# Patient Record
Sex: MALE | Race: WHITE | NOT HISPANIC OR LATINO
[De-identification: names, ages, dates, MRNs, and addresses within clinical notes are randomized per-mention and may not be internally consistent; named-entity substitution may affect disease eponyms.]

---

## 2018-09-20 ENCOUNTER — TRANSCRIPTION ENCOUNTER (OUTPATIENT)
Age: 48
End: 2018-09-20

## 2018-10-01 ENCOUNTER — TRANSCRIPTION ENCOUNTER (OUTPATIENT)
Age: 48
End: 2018-10-01

## 2019-07-06 ENCOUNTER — OUTPATIENT (OUTPATIENT)
Dept: OUTPATIENT SERVICES | Facility: HOSPITAL | Age: 49
LOS: 1 days | End: 2019-07-06
Payer: COMMERCIAL

## 2019-07-06 PROCEDURE — 76870 US EXAM SCROTUM: CPT | Mod: 26

## 2019-07-17 ENCOUNTER — OUTPATIENT (OUTPATIENT)
Dept: OUTPATIENT SERVICES | Facility: HOSPITAL | Age: 49
LOS: 1 days | End: 2019-07-17

## 2020-07-20 ENCOUNTER — EMERGENCY (EMERGENCY)
Facility: HOSPITAL | Age: 50
LOS: 1 days | End: 2020-07-20
Admitting: EMERGENCY MEDICINE
Payer: OTHER MISCELLANEOUS

## 2020-07-20 PROCEDURE — 12001 RPR S/N/AX/GEN/TRNK 2.5CM/<: CPT

## 2020-07-20 PROCEDURE — 99283 EMERGENCY DEPT VISIT LOW MDM: CPT | Mod: 25

## 2021-03-05 ENCOUNTER — EMERGENCY (EMERGENCY)
Facility: HOSPITAL | Age: 51
LOS: 1 days | End: 2021-03-05
Admitting: EMERGENCY MEDICINE
Payer: OTHER MISCELLANEOUS

## 2021-03-05 PROCEDURE — 73110 X-RAY EXAM OF WRIST: CPT | Mod: 26,LT

## 2021-03-05 PROCEDURE — 99283 EMERGENCY DEPT VISIT LOW MDM: CPT

## 2021-04-14 PROBLEM — Z00.00 ENCOUNTER FOR PREVENTIVE HEALTH EXAMINATION: Status: ACTIVE | Noted: 2021-04-14

## 2021-04-22 ENCOUNTER — APPOINTMENT (OUTPATIENT)
Dept: ORTHOPEDIC SURGERY | Facility: CLINIC | Age: 51
End: 2021-04-22
Payer: OTHER MISCELLANEOUS

## 2021-04-22 VITALS — HEIGHT: 76 IN | TEMPERATURE: 97.7 F | BODY MASS INDEX: 29.22 KG/M2 | WEIGHT: 240 LBS

## 2021-04-22 DIAGNOSIS — Z78.9 OTHER SPECIFIED HEALTH STATUS: ICD-10-CM

## 2021-04-22 PROCEDURE — 99072 ADDL SUPL MATRL&STAF TM PHE: CPT

## 2021-04-22 PROCEDURE — 73110 X-RAY EXAM OF WRIST: CPT | Mod: LT

## 2021-04-22 PROCEDURE — 99203 OFFICE O/P NEW LOW 30 MIN: CPT

## 2021-04-22 NOTE — PHYSICAL EXAM
[de-identified] : -Constitutional: This is a male in not obvious distress.\par -Psych: Patient is alert and oriented to person, place and time. Patient has a normal mood and affect.\par -Cardiovascular: Normal pulses throughout the upper extremities. No significant varicosities are noted in the upper extremities.\par -Neuro: Strength and sensation are intact throughout the upper extremities. Patient has normal coordination.\par -Respiratory: Patient exhibits no evidence of shortness of breath or difficulty breathing.\par -Skin: No rashes, lesions, or other abnormalities are noted in the upper extremities.\par \par ---\par \par Examination of his left wrist demonstrates no obvious swelling.  He is tender along the dorsal wrist capsule in the region of the scapholunate ligament.  There is no localized swelling or tenderness along the distal radius, snuffbox or TFCC ligament.  He has limitation of flexion extension of the wrist with associated pain.  There is a negative Davis sign.  He has full flexion and extension of the digits.  He is neurovascularly intact distally.\par   [de-identified] : PA, lateral and PA  views of his left wrist demonstrate no obvious fractures, dislocations or instability patterns.  There is some dorsiflexion of the lunate on the lateral.  I did obtain comparison views of his right wrist to better assess his carpal alignment.

## 2021-04-22 NOTE — ADDENDUM
[FreeTextEntry1] : This note was written by Meg Bai on 04/22/2021 acting solely as a scribe for Dr. Cipriano Abraham.\par \par All medical record entries made by the scribe were at my, Dr. Cipriano Abraham, direction and personally dictated by me on 04/22/2021. I have personally reviewed the chart and agree that the record accurately reflects my personal performance of the history, physical exam, assessment, and plan

## 2021-04-22 NOTE — HISTORY OF PRESENT ILLNESS
[Right] : right hand dominant [FreeTextEntry1] : Workmen's Compensation Case\par Date of accident: 3/4/2021\par Working: No\par \par He comes in today for evaluation of a left wrist injury which occurred 3/4/2021. He was pushing heavy boxes at work and a heavy box rolled and he held it back with his hand. He went to [facility] on 3/5/2021. He was negative for fractures and was diagnosed with a sprained wrist. He reports improvement since the injury. He has been wearing a splint and taking ibuprofen daily. He has pain on the top of his wrist and he notes decreased  strength. He rates his pain a 1-2 out of 10 at this time. \par \par He was referred by [facility].

## 2021-05-06 ENCOUNTER — TRANSCRIPTION ENCOUNTER (OUTPATIENT)
Age: 51
End: 2021-05-06

## 2021-05-12 ENCOUNTER — NON-APPOINTMENT (OUTPATIENT)
Age: 51
End: 2021-05-12

## 2021-05-20 ENCOUNTER — APPOINTMENT (OUTPATIENT)
Dept: ORTHOPEDIC SURGERY | Facility: CLINIC | Age: 51
End: 2021-05-20
Payer: OTHER MISCELLANEOUS

## 2021-05-20 VITALS — BODY MASS INDEX: 29.22 KG/M2 | WEIGHT: 240 LBS | TEMPERATURE: 97.8 F | HEIGHT: 76 IN

## 2021-05-20 PROCEDURE — 99214 OFFICE O/P EST MOD 30 MIN: CPT

## 2021-05-20 PROCEDURE — 99072 ADDL SUPL MATRL&STAF TM PHE: CPT

## 2021-05-20 NOTE — PHYSICAL EXAM
[de-identified] : -Constitutional: This is a male in not obvious distress.\par -Psych: Patient is alert and oriented to person, place and time. Patient has a normal mood and affect.\par -Cardiovascular: Normal pulses throughout the upper extremities. No significant varicosities are noted in the upper extremities.\par -Neuro: Strength and sensation are intact throughout the upper extremities. Patient has normal coordination.\par -Respiratory: Patient exhibits no evidence of shortness of breath or difficulty breathing.\par -Skin: No rashes, lesions, or other abnormalities are noted in the upper extremities.\par \par ---\par \par Examination of his left wrist demonstrates no obvious swelling.  He is tender along the dorsal wrist capsule in the region of the scapholunate ligament.  There is no localized swelling or tenderness along the distal radius, snuffbox or TFCC ligament.  There is no pain ulnarly with ulnar deviation.  He has limitation of flexion extension of the wrist with associated pain.  He has approximately 35 degrees of wrist flexion and extension with 75 degrees of pronation supination.  There is a negative Davis sign.  He has full flexion and extension of the digits.  He is neurovascularly intact distally.\par   [de-identified] : I reviewed the MRI of his left wrist from 5/7/2021.  This demonstrated full-thickness rupture of the dorsal scapholunate ligament with widening of the scapholunate interval.  Focal full-thickness tear of the TFCC ligament along its radial attachment with small DRUJ effusion.  There is adjacent lunate cartilage loss.  There is also evidence of cyst within the lunate.  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  Mild CMC joint arthritis.\par \par Previous PA, lateral and PA  views of his left wrist demonstrated no obvious fractures, dislocations or instability patterns.  There is some dorsiflexion of the lunate on the lateral.  I did obtain comparison views of his right wrist to better assess his carpal alignment.

## 2021-05-20 NOTE — HISTORY OF PRESENT ILLNESS
[FreeTextEntry1] : Workmen's Compensation Case\par Date of accident: 3/4/2021\par Working: No\par \par Follow-up regarding left wrist injury which occurred 3/4/2021. He was pushing heavy boxes at work and a heavy box rolled and he held it follow-up regarding left wrist injury at work on 3/4/2021.  \par \par See note from when he was seen in the office 4 weeks ago.  I ordered an MRI.  He comes in to review the results and discuss treatment recommendations.\par \par He is doing well

## 2021-07-07 ENCOUNTER — OUTPATIENT (OUTPATIENT)
Dept: OUTPATIENT SERVICES | Facility: HOSPITAL | Age: 51
LOS: 1 days | End: 2021-07-07
Payer: COMMERCIAL

## 2021-07-07 VITALS
DIASTOLIC BLOOD PRESSURE: 88 MMHG | SYSTOLIC BLOOD PRESSURE: 153 MMHG | OXYGEN SATURATION: 98 % | HEART RATE: 65 BPM | RESPIRATION RATE: 15 BRPM | TEMPERATURE: 98 F | WEIGHT: 242.07 LBS | HEIGHT: 76 IN

## 2021-07-07 DIAGNOSIS — K08.409 PARTIAL LOSS OF TEETH, UNSPECIFIED CAUSE, UNSPECIFIED CLASS: Chronic | ICD-10-CM

## 2021-07-07 DIAGNOSIS — M25.532 PAIN IN LEFT WRIST: ICD-10-CM

## 2021-07-07 DIAGNOSIS — R94.31 ABNORMAL ELECTROCARDIOGRAM [ECG] [EKG]: ICD-10-CM

## 2021-07-07 DIAGNOSIS — Z90.89 ACQUIRED ABSENCE OF OTHER ORGANS: Chronic | ICD-10-CM

## 2021-07-07 DIAGNOSIS — S63.8X2A SPRAIN OF OTHER PART OF LEFT WRIST AND HAND, INITIAL ENCOUNTER: ICD-10-CM

## 2021-07-07 DIAGNOSIS — Z01.818 ENCOUNTER FOR OTHER PREPROCEDURAL EXAMINATION: ICD-10-CM

## 2021-07-07 LAB
ALBUMIN SERPL ELPH-MCNC: 4.3 G/DL — SIGNIFICANT CHANGE UP (ref 3.3–5)
ALP SERPL-CCNC: 63 U/L — SIGNIFICANT CHANGE UP (ref 30–120)
ALT FLD-CCNC: 46 U/L DA — SIGNIFICANT CHANGE UP (ref 10–60)
ANION GAP SERPL CALC-SCNC: 8 MMOL/L — SIGNIFICANT CHANGE UP (ref 5–17)
AST SERPL-CCNC: 21 U/L — SIGNIFICANT CHANGE UP (ref 10–40)
BILIRUB SERPL-MCNC: 0.5 MG/DL — SIGNIFICANT CHANGE UP (ref 0.2–1.2)
BUN SERPL-MCNC: 15 MG/DL — SIGNIFICANT CHANGE UP (ref 7–23)
CALCIUM SERPL-MCNC: 9.3 MG/DL — SIGNIFICANT CHANGE UP (ref 8.4–10.5)
CHLORIDE SERPL-SCNC: 102 MMOL/L — SIGNIFICANT CHANGE UP (ref 96–108)
CO2 SERPL-SCNC: 28 MMOL/L — SIGNIFICANT CHANGE UP (ref 22–31)
CREAT SERPL-MCNC: 0.97 MG/DL — SIGNIFICANT CHANGE UP (ref 0.5–1.3)
GLUCOSE SERPL-MCNC: 105 MG/DL — HIGH (ref 70–99)
HCT VFR BLD CALC: 45.6 % — SIGNIFICANT CHANGE UP (ref 39–50)
HGB BLD-MCNC: 16.5 G/DL — SIGNIFICANT CHANGE UP (ref 13–17)
MCHC RBC-ENTMCNC: 29.7 PG — SIGNIFICANT CHANGE UP (ref 27–34)
MCHC RBC-ENTMCNC: 36.2 GM/DL — HIGH (ref 32–36)
MCV RBC AUTO: 82.2 FL — SIGNIFICANT CHANGE UP (ref 80–100)
NRBC # BLD: 0 /100 WBCS — SIGNIFICANT CHANGE UP (ref 0–0)
PLATELET # BLD AUTO: 221 K/UL — SIGNIFICANT CHANGE UP (ref 150–400)
POTASSIUM SERPL-MCNC: 3.8 MMOL/L — SIGNIFICANT CHANGE UP (ref 3.5–5.3)
POTASSIUM SERPL-SCNC: 3.8 MMOL/L — SIGNIFICANT CHANGE UP (ref 3.5–5.3)
PROT SERPL-MCNC: 7.9 G/DL — SIGNIFICANT CHANGE UP (ref 6–8.3)
RBC # BLD: 5.55 M/UL — SIGNIFICANT CHANGE UP (ref 4.2–5.8)
RBC # FLD: 11.9 % — SIGNIFICANT CHANGE UP (ref 10.3–14.5)
SODIUM SERPL-SCNC: 138 MMOL/L — SIGNIFICANT CHANGE UP (ref 135–145)
WBC # BLD: 6.86 K/UL — SIGNIFICANT CHANGE UP (ref 3.8–10.5)
WBC # FLD AUTO: 6.86 K/UL — SIGNIFICANT CHANGE UP (ref 3.8–10.5)

## 2021-07-07 PROCEDURE — 36415 COLL VENOUS BLD VENIPUNCTURE: CPT

## 2021-07-07 PROCEDURE — 80053 COMPREHEN METABOLIC PANEL: CPT

## 2021-07-07 PROCEDURE — 93010 ELECTROCARDIOGRAM REPORT: CPT

## 2021-07-07 PROCEDURE — 93005 ELECTROCARDIOGRAM TRACING: CPT

## 2021-07-07 PROCEDURE — G0463: CPT

## 2021-07-07 PROCEDURE — 85027 COMPLETE CBC AUTOMATED: CPT

## 2021-07-07 NOTE — H&P PST ADULT - MUSCULOSKELETAL
details… detailed exam left wrist/arm/hand/no calf tenderness/decreased ROM due to pain/diminished strength

## 2021-07-07 NOTE — H&P PST ADULT - NSICDXPASTMEDICALHX_GEN_ALL_CORE_FT
PAST MEDICAL HISTORY:  GERD (gastroesophageal reflux disease)     Hypothyroid      PAST MEDICAL HISTORY:  GERD (gastroesophageal reflux disease)     Hypothyroid     Rheumatoid arthritis diagnosed a few yrs ago when patient had clicking of fingers-he is asymptomatic and receives no treatment

## 2021-07-07 NOTE — H&P PST ADULT - NSICDXPROBLEM_GEN_ALL_CORE_FT
PROBLEM DIAGNOSES  Problem: Left wrist pain  Assessment and Plan: left wrist diagnostic arthroscopy and possible debridement; patient will have covid swab in [place]; he will call to schedule it; preop instructions given; to go for medical clearance    Problem: Abnormal EKG  Assessment and Plan: to see PCP for evaluation

## 2021-07-07 NOTE — H&P PST ADULT - NSICDXFAMILYHX_GEN_ALL_CORE_FT
FAMILY HISTORY:  FH: alcoholism, father- HTN    Sibling  Still living? Yes, Estimated age: 51-60  FH: ovarian cancer, Age at diagnosis: Age Unknown

## 2021-07-07 NOTE — H&P PST ADULT - HISTORY OF PRESENT ILLNESS
this is a 51 y/o male who had an injury on 3/4/21; tests show tendon problem, to have surgery to evaluate and possibly repair problem

## 2021-07-08 PROBLEM — K21.9 GASTRO-ESOPHAGEAL REFLUX DISEASE WITHOUT ESOPHAGITIS: Chronic | Status: ACTIVE | Noted: 2021-07-07

## 2021-07-08 PROBLEM — M06.9 RHEUMATOID ARTHRITIS, UNSPECIFIED: Chronic | Status: ACTIVE | Noted: 2021-07-07

## 2021-07-08 RX ORDER — CHLORHEXIDINE GLUCONATE 213 G/1000ML
1 SOLUTION TOPICAL ONCE
Refills: 0 | Status: DISCONTINUED | OUTPATIENT
Start: 2021-07-13 | End: 2021-07-27

## 2021-07-08 RX ORDER — CEFAZOLIN SODIUM 1 G
2000 VIAL (EA) INJECTION ONCE
Refills: 0 | Status: DISCONTINUED | OUTPATIENT
Start: 2021-07-13 | End: 2021-07-27

## 2021-07-10 ENCOUNTER — APPOINTMENT (OUTPATIENT)
Dept: DISASTER EMERGENCY | Facility: CLINIC | Age: 51
End: 2021-07-10

## 2021-07-11 LAB — SARS-COV-2 N GENE NPH QL NAA+PROBE: NOT DETECTED

## 2021-07-12 NOTE — ASU PATIENT PROFILE, ADULT - PMH
GERD (gastroesophageal reflux disease)    Hypothyroid    Rheumatoid arthritis  diagnosed a few yrs ago when patient had clicking of fingers-he is asymptomatic and receives no treatment

## 2021-07-13 ENCOUNTER — APPOINTMENT (OUTPATIENT)
Dept: ORTHOPEDIC SURGERY | Facility: HOSPITAL | Age: 51
End: 2021-07-13

## 2021-07-13 ENCOUNTER — NON-APPOINTMENT (OUTPATIENT)
Age: 51
End: 2021-07-13

## 2021-07-13 ENCOUNTER — OUTPATIENT (OUTPATIENT)
Dept: OUTPATIENT SERVICES | Facility: HOSPITAL | Age: 51
LOS: 1 days | End: 2021-07-13
Payer: COMMERCIAL

## 2021-07-13 VITALS
RESPIRATION RATE: 22 BRPM | SYSTOLIC BLOOD PRESSURE: 121 MMHG | DIASTOLIC BLOOD PRESSURE: 83 MMHG | OXYGEN SATURATION: 95 % | HEART RATE: 74 BPM

## 2021-07-13 VITALS
DIASTOLIC BLOOD PRESSURE: 89 MMHG | HEART RATE: 78 BPM | OXYGEN SATURATION: 98 % | HEIGHT: 76 IN | RESPIRATION RATE: 17 BRPM | TEMPERATURE: 98 F | SYSTOLIC BLOOD PRESSURE: 154 MMHG | WEIGHT: 240.74 LBS

## 2021-07-13 DIAGNOSIS — S63.8X2A SPRAIN OF OTHER PART OF LEFT WRIST AND HAND, INITIAL ENCOUNTER: ICD-10-CM

## 2021-07-13 DIAGNOSIS — Z01.818 ENCOUNTER FOR OTHER PREPROCEDURAL EXAMINATION: ICD-10-CM

## 2021-07-13 DIAGNOSIS — K08.409 PARTIAL LOSS OF TEETH, UNSPECIFIED CAUSE, UNSPECIFIED CLASS: Chronic | ICD-10-CM

## 2021-07-13 DIAGNOSIS — Z90.89 ACQUIRED ABSENCE OF OTHER ORGANS: Chronic | ICD-10-CM

## 2021-07-13 PROCEDURE — 29846 WRIST ARTHROSCOPY/SURGERY: CPT | Mod: LT

## 2021-07-13 RX ORDER — ONDANSETRON 8 MG/1
4 TABLET, FILM COATED ORAL ONCE
Refills: 0 | Status: DISCONTINUED | OUTPATIENT
Start: 2021-07-13 | End: 2021-07-14

## 2021-07-13 RX ORDER — SODIUM CHLORIDE 9 MG/ML
1000 INJECTION, SOLUTION INTRAVENOUS
Refills: 0 | Status: DISCONTINUED | OUTPATIENT
Start: 2021-07-13 | End: 2021-07-14

## 2021-07-13 RX ORDER — HYDROMORPHONE HYDROCHLORIDE 2 MG/ML
0.5 INJECTION INTRAMUSCULAR; INTRAVENOUS; SUBCUTANEOUS
Refills: 0 | Status: DISCONTINUED | OUTPATIENT
Start: 2021-07-13 | End: 2021-07-14

## 2021-07-13 RX ORDER — CELECOXIB 200 MG/1
1 CAPSULE ORAL
Qty: 15 | Refills: 0
Start: 2021-07-13

## 2021-07-13 RX ORDER — OXYCODONE HYDROCHLORIDE 5 MG/1
5 TABLET ORAL ONCE
Refills: 0 | Status: DISCONTINUED | OUTPATIENT
Start: 2021-07-13 | End: 2021-07-14

## 2021-07-13 RX ADMIN — SODIUM CHLORIDE 75 MILLILITER(S): 9 INJECTION, SOLUTION INTRAVENOUS at 14:19

## 2021-07-13 NOTE — BRIEF OPERATIVE NOTE - NSICDXBRIEFPREOP_GEN_ALL_CORE_FT
PRE-OP DIAGNOSIS:  Traumatic rupture of scapholunate ligament 13-Jul-2021 13:22:55 LEFT Bridgett Darden

## 2021-07-13 NOTE — ASU DISCHARGE PLAN (ADULT/PEDIATRIC) - CARE PROVIDER_API CALL
Cipriano Abraham)  Orthopaedic Surgery; Surgery of the Hand  [address], [address]  [place] [address]  Phone: [phone]  Fax: [phone]  Scheduled Appointment: 07/22/2021

## 2021-07-13 NOTE — ASU DISCHARGE PLAN (ADULT/PEDIATRIC) - ASU DC SPECIAL INSTRUCTIONSFT
Keep splint clean and dry. Do not remove splint prior to follow up appointment..    Wiggle fingers in splint throughout the day.     Apply ice packs for 20 minutes at a time to left wrist to help reduce pain and swelling.    Follow up with Dr. Abraham in the [place] office on Thursday, July 22nd.

## 2021-07-13 NOTE — BRIEF OPERATIVE NOTE - NSICDXBRIEFPOSTOP_GEN_ALL_CORE_FT
POST-OP DIAGNOSIS:  Traumatic rupture of scapholunate ligament 13-Jul-2021 13:23:02 LEFT Bridgett Darden

## 2021-07-13 NOTE — ASU DISCHARGE PLAN (ADULT/PEDIATRIC) - BATHING
Keep splint clean and dry. Cover with plastic or cast cover to prevent from getting wet when showering or bathing./Do not submerge in water

## 2021-07-22 ENCOUNTER — APPOINTMENT (OUTPATIENT)
Dept: ORTHOPEDIC SURGERY | Facility: CLINIC | Age: 51
End: 2021-07-22
Payer: OTHER MISCELLANEOUS

## 2021-07-22 VITALS — TEMPERATURE: 97.4 F | WEIGHT: 240 LBS | HEIGHT: 76 IN | BODY MASS INDEX: 29.22 KG/M2

## 2021-07-22 PROCEDURE — 99024 POSTOP FOLLOW-UP VISIT: CPT

## 2021-07-22 PROCEDURE — 73110 X-RAY EXAM OF WRIST: CPT | Mod: LT

## 2021-07-22 NOTE — HISTORY OF PRESENT ILLNESS
[de-identified] : 9 days post-operative. [de-identified] : 9 days status post left wrist diagnostic arthroscopy and debridement.\par \par See operative note, diagnostic arthroscopy demonstrated a full-thickness scapholunate ligament rupture with gross instability.\par \par He is having some pain, comparable to preoperative. [de-identified] : Examination of his left wrist after the splint and dressing was removed demonstrates his portal incisions to be clean and dry.  There is mild swelling and ecchymosis.  He has full flexion and extension of the digits.  He has approximately 25 degrees of wrist flexion and 20 degrees extension, similar to preoperative.  He is neurovascularly intact distally. [de-identified] : PA, lateral, PA ulnar deviation and PA  views of his left wrist demonstrate a DISI deformity and an increased scapholunate ligament angle. There is no obvious widening of the scapholunate interval. [de-identified] : Stable, 9 days post-operative, status post diagnostic left wrist arthroscopy, with evidence of a full-thickness scapholunate ligament rupture. [de-identified] : The sutures were removed and steri-strips were applied.  He was instructed on activity modification, protection with a splint, gentle range of motion exercises and local wound care.\par \par We discussed definitive treatment of his scapholunate ligament rupture.  Based upon the findings on wrist arthroscopy, there is no evidence of significant arthritis.  I therefore recommended scapholunate ligament reconstruction.  I would also recommend PIN and AIN neurectomy at the same time.  I had a lengthy discussion with him regarding the procedure and expected recovery.  He has done some research on his own.  I did tell him that, unfortunately, there is no definitive treatment that produces excellent results for this injury.  However, if left alone, patient develop arthritis.  He would like to go ahead and schedule surgery.  I have recommended reconstruction of the scapholunate ligament with a palmaris longus autograft and at the same time PIN and AIN neurectomy.  He will be scheduled once authorized by Workmen's Compensation.  He understands the prolonged recovery and the need for casting for 6 to 8 weeks.  I also discussed that he may need to return to the OR if the wire cannot be removed in the office after 6 to 8 weeks.\par \par -  The nature and purposes of the operation/procedure was discussed in detail.  I discussed the surgical procedure in detail, as well as expected postoperative recovery and outcome.\par -  Possible risks, benefits, and complications (from known and unknown causes) of the procedure were discussed in detail.  \par -  Possible non-operative alternatives to the proposed treatment were discussed in detail.  \par -  He was told that possible risks/complications include, but are not limited to:  Infection, dorsal sensory nerve or vessel injury, stiffness, painful scar, poor outcome, need for additional surgical procedures, and other unforeseen complications.  \par -  In addition, the possibility of an "unsuccessful outcome," despite "successful surgery," was discussed with him.  Again, he understands the unpredictable nature of this injury and reconstructive procedure.  He understands that he may require further surgery in the future and if the surgery fails, the possible need for either a proximal row carpectomy or a scaphoid excision and 4 corner fusion was discussed in the future.  He also understands the risks of the development of arthritis in the future and potential failure of the ligament reconstruction.\par -  The patient fully understands these risks and wishes to proceed.  \par -  I had a lengthy discussion with the patient regarding today's visit, the diagnosis, and my surgical treatment recommendations.  The patient has agreed to this plan of management and has expressed full understanding.  All questions were fully answered to the patient's satisfaction.

## 2021-08-10 ENCOUNTER — OUTPATIENT (OUTPATIENT)
Dept: OUTPATIENT SERVICES | Facility: HOSPITAL | Age: 51
LOS: 1 days | End: 2021-08-10
Payer: COMMERCIAL

## 2021-08-10 VITALS
SYSTOLIC BLOOD PRESSURE: 130 MMHG | WEIGHT: 246.04 LBS | OXYGEN SATURATION: 99 % | TEMPERATURE: 97 F | DIASTOLIC BLOOD PRESSURE: 80 MMHG | RESPIRATION RATE: 14 BRPM | HEART RATE: 63 BPM | HEIGHT: 76 IN

## 2021-08-10 DIAGNOSIS — Z98.890 OTHER SPECIFIED POSTPROCEDURAL STATES: Chronic | ICD-10-CM

## 2021-08-10 DIAGNOSIS — K08.409 PARTIAL LOSS OF TEETH, UNSPECIFIED CAUSE, UNSPECIFIED CLASS: Chronic | ICD-10-CM

## 2021-08-10 DIAGNOSIS — Z01.818 ENCOUNTER FOR OTHER PREPROCEDURAL EXAMINATION: ICD-10-CM

## 2021-08-10 DIAGNOSIS — S63.8X2A SPRAIN OF OTHER PART OF LEFT WRIST AND HAND, INITIAL ENCOUNTER: ICD-10-CM

## 2021-08-10 DIAGNOSIS — Z90.89 ACQUIRED ABSENCE OF OTHER ORGANS: Chronic | ICD-10-CM

## 2021-08-10 LAB
ANION GAP SERPL CALC-SCNC: 7 MMOL/L — SIGNIFICANT CHANGE UP (ref 5–17)
BUN SERPL-MCNC: 19 MG/DL — SIGNIFICANT CHANGE UP (ref 7–23)
CALCIUM SERPL-MCNC: 9.3 MG/DL — SIGNIFICANT CHANGE UP (ref 8.4–10.5)
CHLORIDE SERPL-SCNC: 102 MMOL/L — SIGNIFICANT CHANGE UP (ref 96–108)
CO2 SERPL-SCNC: 26 MMOL/L — SIGNIFICANT CHANGE UP (ref 22–31)
CREAT SERPL-MCNC: 0.95 MG/DL — SIGNIFICANT CHANGE UP (ref 0.5–1.3)
GLUCOSE SERPL-MCNC: 98 MG/DL — SIGNIFICANT CHANGE UP (ref 70–99)
HCT VFR BLD CALC: 45.6 % — SIGNIFICANT CHANGE UP (ref 39–50)
HGB BLD-MCNC: 16.4 G/DL — SIGNIFICANT CHANGE UP (ref 13–17)
MCHC RBC-ENTMCNC: 29.9 PG — SIGNIFICANT CHANGE UP (ref 27–34)
MCHC RBC-ENTMCNC: 36 GM/DL — SIGNIFICANT CHANGE UP (ref 32–36)
MCV RBC AUTO: 83.2 FL — SIGNIFICANT CHANGE UP (ref 80–100)
NRBC # BLD: 0 /100 WBCS — SIGNIFICANT CHANGE UP (ref 0–0)
PLATELET # BLD AUTO: 249 K/UL — SIGNIFICANT CHANGE UP (ref 150–400)
POTASSIUM SERPL-MCNC: 4 MMOL/L — SIGNIFICANT CHANGE UP (ref 3.5–5.3)
POTASSIUM SERPL-SCNC: 4 MMOL/L — SIGNIFICANT CHANGE UP (ref 3.5–5.3)
RBC # BLD: 5.48 M/UL — SIGNIFICANT CHANGE UP (ref 4.2–5.8)
RBC # FLD: 12.2 % — SIGNIFICANT CHANGE UP (ref 10.3–14.5)
SODIUM SERPL-SCNC: 135 MMOL/L — SIGNIFICANT CHANGE UP (ref 135–145)
WBC # BLD: 8.08 K/UL — SIGNIFICANT CHANGE UP (ref 3.8–10.5)
WBC # FLD AUTO: 8.08 K/UL — SIGNIFICANT CHANGE UP (ref 3.8–10.5)

## 2021-08-10 PROCEDURE — 85027 COMPLETE CBC AUTOMATED: CPT

## 2021-08-10 PROCEDURE — G0463: CPT

## 2021-08-10 PROCEDURE — 93005 ELECTROCARDIOGRAM TRACING: CPT

## 2021-08-10 PROCEDURE — 36415 COLL VENOUS BLD VENIPUNCTURE: CPT

## 2021-08-10 PROCEDURE — 93010 ELECTROCARDIOGRAM REPORT: CPT

## 2021-08-10 PROCEDURE — 80048 BASIC METABOLIC PNL TOTAL CA: CPT

## 2021-08-10 NOTE — H&P PST ADULT - HISTORY OF PRESENT ILLNESS
This is a 49 y/o male who had an injury on 3/4/21; tests show tendon problem, to have surgery to evaluate and possibly repair problem This is a 51 y/o male presents with complaint of left wrist pain , limited ROM , diminished strength s/p work injury 3/4/32 .He had undergone left wrist diagnostic arthroscopy and debridement on 7/13/21 .with no improvement  scheduled for left wrist scapholunate ligament reconstruction on 8/24/21  This is a 49 y/o male presents with complaint of left wrist pain , limited ROM , diminished strength s/p work injury 3/4/21 .He had undergone left wrist diagnostic arthroscopy and debridement on 7/13/21 .with no improvement  scheduled for left wrist scapholunate ligament reconstruction on 8/24/21

## 2021-08-10 NOTE — H&P PST ADULT - NSICDXPASTMEDICALHX_GEN_ALL_CORE_FT
PAST MEDICAL HISTORY:  GERD (gastroesophageal reflux disease)     Hypothyroid no medications    Rheumatoid arthritis diagnosed a few yrs ago when patient had clicking of fingers-he is asymptomatic and receives no treatment

## 2021-08-10 NOTE — H&P PST ADULT - ASSESSMENT
this is a 51 y/o male who is scheduled for left wrist diagnostic arthroscopy on 7/13/21 49 y/o male with left wrist pain      scheduled for left wrist scapholunate ligament reconstruction on 8/24/21   Medical clearance   Pre op instructions   COVID test at [facility]

## 2021-08-10 NOTE — H&P PST ADULT - MUSCULOSKELETAL
left wrist/arm/hand/no calf tenderness/decreased ROM due to pain/diminished strength details… detailed exam

## 2021-08-10 NOTE — H&P PST ADULT - NSICDXPASTSURGICALHX_GEN_ALL_CORE_FT
PAST SURGICAL HISTORY:  S/P tonsillectomy     S/P tooth extraction wisdom tooth    Status post arthroscopy left wrist diagnostic arthroscopy 7/13/21

## 2021-08-12 NOTE — HISTORY OF PRESENT ILLNESS
[de-identified] : 5 weeks and 2 days post-operative. [de-identified] : 5 weeks and 2 days status post left wrist diagnostic arthroscopy and debridement.\par \par See note from when he was seen in the office 4 weeks ago.  I requested authorization for scapholunate ligament reconstruction and AIN and PIN neurectomy from Worker's Compensation.\par \par He is [de-identified] : Examination of his left wrist demonstrates his portal incisions to be well-healed.  There is decreased swelling.  He has full flexion and extension of the digits.  He has approximately 25 degrees of wrist flexion and 20 degrees extension, similar to preoperative.  He is neurovascularly intact distally. [de-identified] : Previous PA, lateral, PA ulnar deviation and PA  views of his left wrist demonstrate a DISI deformity and an increased scapholunate ligament angle. There is no obvious widening of the scapholunate interval. [de-identified] : Stable, 5 weeks and 2 days post-operative, status post diagnostic left wrist arthroscopy, with evidence of a full-thickness scapholunate ligament rupture. [de-identified] : The sutures were removed and steri-strips were applied.  He was instructed on activity modification, protection with a splint, gentle range of motion exercises and local wound care.\par \par We discussed definitive treatment of his scapholunate ligament rupture.  Based upon the findings on wrist arthroscopy, there is no evidence of significant arthritis.  I therefore recommended scapholunate ligament reconstruction.  I would also recommend PIN and AIN neurectomy at the same time.  I had a lengthy discussion with him regarding the procedure and expected recovery.  He has done some research on his own.  I did tell him that, unfortunately, there is no definitive treatment that produces excellent results for this injury.  However, if left alone, patient develop arthritis.  He would like to go ahead and schedule surgery.  I have recommended reconstruction of the scapholunate ligament with a palmaris longus autograft and at the same time PIN and AIN neurectomy.  He will be scheduled once authorized by Workmen's Compensation.  He understands the prolonged recovery and the need for casting for 6 to 8 weeks.  I also discussed that he may need to return to the OR if the wire cannot be removed in the office after 6 to 8 weeks.\par \par -  The nature and purposes of the operation/procedure was discussed in detail.  I discussed the surgical procedure in detail, as well as expected postoperative recovery and outcome.\par -  Possible risks, benefits, and complications (from known and unknown causes) of the procedure were discussed in detail.  \par -  Possible non-operative alternatives to the proposed treatment were discussed in detail.  \par -  He was told that possible risks/complications include, but are not limited to:  Infection, dorsal sensory nerve or vessel injury, stiffness, painful scar, poor outcome, need for additional surgical procedures, and other unforeseen complications.  \par -  In addition, the possibility of an "unsuccessful outcome," despite "successful surgery," was discussed with him.  Again, he understands the unpredictable nature of this injury and reconstructive procedure.  He understands that he may require further surgery in the future and if the surgery fails, the possible need for either a proximal row carpectomy or a scaphoid excision and 4 corner fusion was discussed in the future.  He also understands the risks of the development of arthritis in the future and potential failure of the ligament reconstruction.\par -  The patient fully understands these risks and wishes to proceed.  \par -  I had a lengthy discussion with the patient regarding today's visit, the diagnosis, and my surgical treatment recommendations.  The patient has agreed to this plan of management and has expressed full understanding.  All questions were fully answered to the patient's satisfaction.

## 2021-08-15 PROBLEM — E03.9 HYPOTHYROIDISM, UNSPECIFIED: Chronic | Status: ACTIVE | Noted: 2021-07-07

## 2021-08-17 DIAGNOSIS — Z01.818 ENCOUNTER FOR OTHER PREPROCEDURAL EXAMINATION: ICD-10-CM

## 2021-08-19 ENCOUNTER — APPOINTMENT (OUTPATIENT)
Dept: ORTHOPEDIC SURGERY | Facility: CLINIC | Age: 51
End: 2021-08-19

## 2021-08-21 ENCOUNTER — APPOINTMENT (OUTPATIENT)
Dept: DISASTER EMERGENCY | Facility: CLINIC | Age: 51
End: 2021-08-21

## 2021-08-22 LAB — SARS-COV-2 N GENE NPH QL NAA+PROBE: NOT DETECTED

## 2021-08-23 ENCOUNTER — TRANSCRIPTION ENCOUNTER (OUTPATIENT)
Age: 51
End: 2021-08-23

## 2021-08-23 NOTE — ASU PATIENT PROFILE, ADULT - PRO MENTAL HEALTH SX RECENT
[facility]    [address] [address]    Phone:  [phone]       Thank You for choosing us for your health care visit. We are glad to serve you and happy to provide you with this summary of your visit. Please help us to ensure we have accurate records. If you find anything that needs to be changed, please let our staff know as soon as possible.          Your Demographic Information     Patient Name Sex Zeb Newby Male 2014       Ethnic Group Patient Race    Not of  or  Origin White      Your Visit Details     Date & Time Provider Department    2017 6:15 PM Sarah Schwartz PA-C [facility]      Your Upcoming Appointment*(Max 10)     2017 10:00 AM CST   Well Child Exam with Alfredo Burns DO   [facility] - [facility] ([facility])    [address] [address] [phone]              Your To Do List     Follow-Up    Return if symptoms worsen or fail to improve.      Conditions Discussed Today or Order-Related Diagnoses        Comments    Acute suppurative otitis media of both ears without spontaneous rupture of tympanic membranes, recurrence not specified    -  Primary       Your Vitals Were     Pulse Temp Resp Height Weight SpO2    126 97.6 °F (36.4 °C) (Temporal Artery) 26 2' 0.75\" (0.629 m) (<1 %, Z= -6.83)* 31 lb 8.4 oz (14.3 kg) (86 %, Z= 1.06)* 97%    BMI                36.18 kg/m2 (>99 %, Z= 4.95)*        *Growth percentiles are based on CDC 2-20 Years data.      Medications Prescribed or Re-Ordered Today     amoxicillin (AMOXIL) 400 MG/5ML suspension    Sig - Route: Take 8 mLs by mouth 2 times daily. - Oral    Class: Eprescribe    [facility] [id number] [place] [address] [place] - [address] AT [facility] #: [phone]      Your Current Medications Are        Disp Refills  Start End    amoxicillin (AMOXIL) 400 MG/5ML suspension 160 mL 0 1/13/2017     Sig - Route: Take 8 mLs by mouth 2 times daily. - Oral    Class: Eprescribe      Allergies     No Known Allergies              Patient Instructions      Acute Otitis Media with Infection (Child)    Your child has a middle ear infection (acute otitis media). It is caused by bacteria or fungi. The middle ear is the space behind the eardrum. The eustachian tube connects the ear to the nasal passage. The eustachian tubes help drain fluid from the ears. They also keep the air pressure equal inside and outside the ears. These tubes are shorter and more horizontal in children. This makes it more likely for the tubes to become blocked. A blockage lets fluid and pressure build up in the middle ear. Bacteria or fungi can grow in this fluid and cause an ear infection. This infection is commonly known as an earache.  The main symptom of an ear infection is ear pain. Other symptoms may include pulling at the ear, being more fussy than usual, decreased appetie, vomiting or diarrhea.Your child’s hearing may also be affected. Your child may have had a respiratory infection first.  An ear infection may clear up on its own. Or your child may need to take medicine. After the infection goes away, your child may still have fluid in the middle ear. It may take weeks or months for this fluid to go away. During that time, your child may have temporary hearing loss. But all other symptoms of the earache should be gone.  Home care  Follow these guidelines when caring for your child at home:  · The health care provider will likely prescribe medicines for pain. The provider may also prescribe antibiotics or antifungals to treat the infection. These may be liquid medicines to give by mouth. Or they may be ear drops. Follow the provider’s instructions for giving these medicines to your child.  · Because ear infections can clear up on their own, the provider may  suggest waiting for a few days before giving your child medicines for infection.  · To reduce pain, have your child rest in an upright position. Hot or cold compresses held against the ear may help ease pain.  · Keep the ear dry. Have your child wear a shower cap when bathing.  To help prevent future infections:  · Avoid smoking near your child. Secondhand smoke raises the risk for ear infections in children.  · Make sure your child gets all appropriate vaccinations.  · Do not bottle feed while your baby is lying on his or her back. (This position can cause  middle ear infections because it allows milk to run into the eustacian tubes.)      · If you breastfeed ccontinue until your child is 6-12 months of age.  To apply ear drops:  1. Put the bottle in warm water if the medicine is kept in the refrigerator. Cold drops in the ear are uncomfortable.  2. Have your child lie down on a flat surface. Gently hold your child’s head to one side.  3. Remove any drainage from the ear with a clean tissue or cotton swab. Clean only the outer ear. Don’t put the cotton swab into the ear canal.  4. Straighten the ear canal by gently pulling the earlobe up and back.  5. Keep the dropper a half-inch above the ear canal. This will keep the dropper from becoming contaminated. Put the drops against the side of the ear canal.  6. Have your child stay lying down for 2 to 3 minutes. This gives time for the medicine to enter the ear canal. If your child doesn’t have pain, gently massage the outer ear near the opening.  7. Wipe any extra medicine away from the outer ear with a clean cotton ball.  Follow-up care  Follow up with your child’s healthcare provider as directed. Your child will need to have the ear rechecked to make sure the infection has resolved. Check with your doctor to see when they want to see your child.  Special note to parents  If your child continues to get earaches, he or she may need ear tubes. The provider will put  small tubes in your child’s eardrum to help keep fluid from building up. This procedure is a simple and works well.  When to seek medical advice  Unless advised otherwise, call your child's healthcare provider if:  · Your child is 3 months old or younger and has a fever of 100.4°F (38°C) or higher. Your child may need to see a healthcare provider.  · Your child is of any age and has fevers higher than 104°F (40°C) that come back again and again.  Call your child's healthcare provider for any of the following:  · New symptoms, especially swelling around the ear or weakness of face muscles  · Severe pain  · Infection seems to get worse, not better   · Neck pain  · Your child acts very sick or not themself  · Fever or pain do not improve with antibiotics after 48 hours  © 2697-4352 [employer]. [address] [place] [address], [place] [address]. All rights reserved. This information is not intended as a substitute for professional medical care. Always follow your healthcare professional's instructions.    Thank you for visiting the [facility] in [place].     It is difficult to recognize all elements of any illness or injury in a single visit. The examination, treatment, and x-rays received are on a preliminary basis only. A radiologist will also review your x-rays for final reading.     Call your primary care provider if you have questions or problems before your next appointment. If you are unable to reach your Primary Care Provider (PCP), please call or return to the Walk-In Clinic. If symptoms worsen or do not resolve please follow-up with your Primary Care Provider (PCP), Walk-In or the nearest Emergency Room for emergency symptoms. If you are unsure of whom to follow up with, call [phone] and ask to speak with either your PCP, the Urgent Care nurse or the on-call Provider if you are calling after hours.     If you are referred to a specialist or scheduled for a test, our  Referrals department will call you with your appointment date and time within 3 business days. If you have not heard from them in this time frame, please call [phone] and ask for the Referrals department.     Test results: Unless otherwise instructed, you should be notified of test results within one week. Please call our office if you do not hear from us within this time frame at [phone].     Hours:   Monday through Friday: 7:00 am to 7:00 pm   Saturday: 8:00 am to 2:00 pm   Holiday hours may vary, please call [phone] on Holidays.   Walk-In is closed on Thanksgiving Day, Jun[place] Day and New Year's Day.     Thank you again for visiting [facility], Urgent Care Clinic, [place], [place].     Sarah Schwartz PA-C    Help us to grow our quality of service! We want to improve - and you can help!You may receive a survey in the mail. This is your opportunity to tell us what excellent service you received, and where we could use improvement. We value your input!     SCHEDULE ONLINE:  Interested in decreasing your wait time in the Walk-in/Urgent Care Clinic?  Same day appointments can now be made.  Go to [email or web address] and scroll down to the green area.  Click on Urgent Care Reservations and make an appointment at the location of your choice.  There you will find the current wait times at each [place] site.  We will do our best to honor your scheduled appointment time but please understand that wait times are subject to change once you arrive at the clinic.          none

## 2021-08-23 NOTE — ASU PATIENT PROFILE, ADULT - BILL OF RIGHTS/ADMISSION INFORMATION PROVIDED TO:
Patient A&O X1-2, hx dementia, on RA. VSS, . Incontinent with brief- DTV by 0915, LBM 5/31. Patient admitted for unwitnessed fall and UTI. Prior left hip ORIF, covered with ABD dressing, c/d/i. Staples intact to left hip.  Immobilizer in place to LLE fro Patient

## 2021-08-24 ENCOUNTER — RESULT REVIEW (OUTPATIENT)
Age: 51
End: 2021-08-24

## 2021-08-24 ENCOUNTER — APPOINTMENT (OUTPATIENT)
Dept: ORTHOPEDIC SURGERY | Facility: HOSPITAL | Age: 51
End: 2021-08-24

## 2021-08-24 ENCOUNTER — OUTPATIENT (OUTPATIENT)
Dept: OUTPATIENT SERVICES | Facility: HOSPITAL | Age: 51
LOS: 1 days | End: 2021-08-24
Payer: COMMERCIAL

## 2021-08-24 VITALS
RESPIRATION RATE: 16 BRPM | HEART RATE: 80 BPM | OXYGEN SATURATION: 95 % | DIASTOLIC BLOOD PRESSURE: 60 MMHG | SYSTOLIC BLOOD PRESSURE: 119 MMHG

## 2021-08-24 VITALS
SYSTOLIC BLOOD PRESSURE: 140 MMHG | WEIGHT: 246.04 LBS | HEART RATE: 78 BPM | OXYGEN SATURATION: 96 % | DIASTOLIC BLOOD PRESSURE: 87 MMHG | HEIGHT: 76 IN | RESPIRATION RATE: 16 BRPM | TEMPERATURE: 98 F

## 2021-08-24 DIAGNOSIS — Z98.890 OTHER SPECIFIED POSTPROCEDURAL STATES: Chronic | ICD-10-CM

## 2021-08-24 DIAGNOSIS — Z01.818 ENCOUNTER FOR OTHER PREPROCEDURAL EXAMINATION: ICD-10-CM

## 2021-08-24 DIAGNOSIS — K08.409 PARTIAL LOSS OF TEETH, UNSPECIFIED CAUSE, UNSPECIFIED CLASS: Chronic | ICD-10-CM

## 2021-08-24 DIAGNOSIS — S63.8X2A SPRAIN OF OTHER PART OF LEFT WRIST AND HAND, INITIAL ENCOUNTER: ICD-10-CM

## 2021-08-24 DIAGNOSIS — Z90.89 ACQUIRED ABSENCE OF OTHER ORGANS: Chronic | ICD-10-CM

## 2021-08-24 PROCEDURE — 76000 FLUOROSCOPY <1 HR PHYS/QHP: CPT

## 2021-08-24 PROCEDURE — 64772 INCISION OF SPINAL NERVE: CPT | Mod: 78,LT

## 2021-08-24 PROCEDURE — 25320 REPAIR/REVISE WRIST JOINT: CPT | Mod: 78,LT

## 2021-08-24 PROCEDURE — 88304 TISSUE EXAM BY PATHOLOGIST: CPT

## 2021-08-24 PROCEDURE — 88304 TISSUE EXAM BY PATHOLOGIST: CPT | Mod: 26

## 2021-08-24 PROCEDURE — 25320 REPAIR/REVISE WRIST JOINT: CPT | Mod: LT

## 2021-08-24 PROCEDURE — C1713: CPT

## 2021-08-24 RX ORDER — OXYCODONE HYDROCHLORIDE 5 MG/1
5 TABLET ORAL ONCE
Refills: 0 | Status: DISCONTINUED | OUTPATIENT
Start: 2021-08-24 | End: 2021-08-25

## 2021-08-24 RX ORDER — CEFAZOLIN SODIUM 1 G
2000 VIAL (EA) INJECTION ONCE
Refills: 0 | Status: COMPLETED | OUTPATIENT
Start: 2021-08-24 | End: 2021-08-24

## 2021-08-24 RX ORDER — HYDROMORPHONE HYDROCHLORIDE 2 MG/ML
0.5 INJECTION INTRAMUSCULAR; INTRAVENOUS; SUBCUTANEOUS
Refills: 0 | Status: DISCONTINUED | OUTPATIENT
Start: 2021-08-24 | End: 2021-08-25

## 2021-08-24 RX ORDER — ACETAMINOPHEN 500 MG
2 TABLET ORAL
Qty: 0 | Refills: 0 | DISCHARGE

## 2021-08-24 RX ORDER — IBUPROFEN 200 MG
1 TABLET ORAL
Qty: 12 | Refills: 0
Start: 2021-08-24 | End: 2021-08-26

## 2021-08-24 RX ORDER — APREPITANT 80 MG/1
40 CAPSULE ORAL ONCE
Refills: 0 | Status: COMPLETED | OUTPATIENT
Start: 2021-08-24 | End: 2021-08-24

## 2021-08-24 RX ORDER — CHLORHEXIDINE GLUCONATE 213 G/1000ML
1 SOLUTION TOPICAL ONCE
Refills: 0 | Status: COMPLETED | OUTPATIENT
Start: 2021-08-24 | End: 2021-08-24

## 2021-08-24 RX ORDER — SODIUM CHLORIDE 9 MG/ML
1000 INJECTION, SOLUTION INTRAVENOUS
Refills: 0 | Status: DISCONTINUED | OUTPATIENT
Start: 2021-08-24 | End: 2021-08-25

## 2021-08-24 RX ORDER — ACETAMINOPHEN 500 MG
1000 TABLET ORAL ONCE
Refills: 0 | Status: COMPLETED | OUTPATIENT
Start: 2021-08-24 | End: 2021-08-24

## 2021-08-24 RX ADMIN — APREPITANT 40 MILLIGRAM(S): 80 CAPSULE ORAL at 10:12

## 2021-08-24 RX ADMIN — HYDROMORPHONE HYDROCHLORIDE 0.5 MILLIGRAM(S): 2 INJECTION INTRAMUSCULAR; INTRAVENOUS; SUBCUTANEOUS at 14:33

## 2021-08-24 RX ADMIN — CHLORHEXIDINE GLUCONATE 1 APPLICATION(S): 213 SOLUTION TOPICAL at 10:11

## 2021-08-24 RX ADMIN — SODIUM CHLORIDE 75 MILLILITER(S): 9 INJECTION, SOLUTION INTRAVENOUS at 14:35

## 2021-08-24 RX ADMIN — HYDROMORPHONE HYDROCHLORIDE 0.5 MILLIGRAM(S): 2 INJECTION INTRAMUSCULAR; INTRAVENOUS; SUBCUTANEOUS at 14:45

## 2021-08-24 NOTE — BRIEF OPERATIVE NOTE - NSICDXBRIEFPOSTOP_GEN_ALL_CORE_FT
POST-OP DIAGNOSIS:  Tear of left scapholunate ligament, initial encounter 24-Aug-2021 14:22:21  Derek Johnson

## 2021-08-24 NOTE — ASU DISCHARGE PLAN (ADULT/PEDIATRIC) - CARE PROVIDER_API CALL
Cipriano Abraham)  Orthopaedic Surgery; Surgery of the Hand  [address], [address]  [place] [address]  Phone: [phone]  Fax: [phone]  Scheduled Appointment: 09/02/2021

## 2021-09-02 ENCOUNTER — APPOINTMENT (OUTPATIENT)
Dept: ORTHOPEDIC SURGERY | Facility: CLINIC | Age: 51
End: 2021-09-02
Payer: OTHER MISCELLANEOUS

## 2021-09-02 VITALS — HEIGHT: 76 IN | BODY MASS INDEX: 29.22 KG/M2 | TEMPERATURE: 98.1 F | WEIGHT: 240 LBS

## 2021-09-02 PROCEDURE — 99024 POSTOP FOLLOW-UP VISIT: CPT

## 2021-09-02 PROCEDURE — 73110 X-RAY EXAM OF WRIST: CPT | Mod: LT

## 2021-09-02 PROCEDURE — 29075 APPL CST ELBW FNGR SHORT ARM: CPT | Mod: 58,LT

## 2021-09-15 ENCOUNTER — NON-APPOINTMENT (OUTPATIENT)
Age: 51
End: 2021-09-15

## 2021-09-15 NOTE — HISTORY OF PRESENT ILLNESS
[de-identified] : 9 days postoperative. [de-identified] : 9 days status post left scapholunate ligament reconstruction.\par \par He is doing well and has minimal pain. [de-identified] : Examination of his left wrist after the splint and dressing were removed demonstrates his incisions and pin site to be clean and dry.  There is some swelling.  There is no drainage or evidence of an infection.  He has some limitation of terminal flexion and extension of the digits.  He is neurovascularly intact distally. [de-identified] : PA, lateral and oblique radiographs of his left wrist demonstrate good carpal alignment with good position of the K wire. [de-identified] : The pin site was clean.  The suture ends were cut and Steri-Strips were applied.  He was placed into a well-padded and well molded left short arm fiberglass cast thumb spica cast..  He was instructed on cast care and activity modification.  He will follow-up in 3 weeks. [de-identified] : Stable, 9 days postoperative.

## 2021-09-16 PROBLEM — S63.502A LEFT WRIST SPRAIN: Status: ACTIVE | Noted: 2021-04-22

## 2021-09-23 ENCOUNTER — APPOINTMENT (OUTPATIENT)
Dept: ORTHOPEDIC SURGERY | Facility: CLINIC | Age: 51
End: 2021-09-23
Payer: OTHER MISCELLANEOUS

## 2021-09-23 VITALS — TEMPERATURE: 98.1 F | HEIGHT: 76 IN | WEIGHT: 240 LBS | BODY MASS INDEX: 29.22 KG/M2

## 2021-09-23 DIAGNOSIS — S63.502A UNSPECIFIED SPRAIN OF LEFT WRIST, INITIAL ENCOUNTER: ICD-10-CM

## 2021-09-23 PROCEDURE — 73110 X-RAY EXAM OF WRIST: CPT | Mod: LT

## 2021-09-23 PROCEDURE — 99024 POSTOP FOLLOW-UP VISIT: CPT

## 2021-09-23 PROCEDURE — 29075 APPL CST ELBW FNGR SHORT ARM: CPT | Mod: 58,LT

## 2021-09-23 NOTE — HISTORY OF PRESENT ILLNESS
[de-identified] : 30 days postoperative. [de-identified] : 30 days status post left scapholunate ligament reconstruction.\par \par He is doing well and has minimal pain. [de-identified] : Examination of his left wrist after the cast was demonstrates his incisions and pin site to be clean and dry.  There is decreased swelling.  There is no drainage or evidence of an infection.  He has some limitation of terminal flexion and extension of the digits.  He is neurovascularly intact distally. [de-identified] : PA, lateral and oblique radiographs of his left wrist demonstrate good carpal alignment with good position of the K wire.  There is possibly mild dorsiflexion of the lunate, but this may be positional. [de-identified] : Stable, 30 days postoperative. [de-identified] : The pin site was clean.  The suture ends were cut and Steri-Strips were applied.  He was placed into a well-padded and well molded left short arm fiberglass cast thumb spica cast..  He was instructed on cast care and activity modification.  He was also instructed on more aggressive flexion and extension exercises to the digits.  He will follow-up in 4 weeks.

## 2021-10-21 ENCOUNTER — APPOINTMENT (OUTPATIENT)
Dept: ORTHOPEDIC SURGERY | Facility: CLINIC | Age: 51
End: 2021-10-21
Payer: OTHER MISCELLANEOUS

## 2021-10-21 VITALS — TEMPERATURE: 98.2 F | BODY MASS INDEX: 29.22 KG/M2 | HEIGHT: 76 IN | WEIGHT: 240 LBS

## 2021-10-21 PROCEDURE — 99024 POSTOP FOLLOW-UP VISIT: CPT

## 2021-10-21 PROCEDURE — 20670 REMOVAL IMPLANT SUPERFICIAL: CPT | Mod: 58,LT

## 2021-10-21 PROCEDURE — 73110 X-RAY EXAM OF WRIST: CPT | Mod: LT

## 2021-10-21 NOTE — HISTORY OF PRESENT ILLNESS
[de-identified] : 8 weeks and 2 days postoperative. [de-identified] : 8 weeks and 2 days status post left scapholunate ligament reconstruction.\par \par He is doing well and has minimal pain.\par \par He denies numbness and tingling in his fingers. He rates his pain a 0 out of 10 at this time.  [de-identified] : Examination of his left wrist after the cast was demonstrates his incisions to be healing well.  There is slight serous drainage around the pin site.  The pin site was removed without incident.  There is decreased swelling.  He has some limitation of terminal flexion and extension of the digits.  He remains neurovascularly intact distally. [de-identified] : PA, lateral and oblique radiographs of his left wrist demonstrate good carpal alignment.  There is no widening of the scapholunate interval.  There is possibly mild dorsiflexion of the lunate, but this may be positional. [de-identified] : Stable, 8 weeks and 2 days postoperative. [de-identified] : At this time, he was instructed on protection with a splint, gentle range of motion exercises and scar massage and desensitization.  He will follow-up in 3 weeks.  At that time he will likely begin a course of hand therapy.

## 2021-10-21 NOTE — ADDENDUM
[FreeTextEntry1] : I, Huseyin Larios, acted solely as a scribe for Dr. Abraham on this date on 10/21/2021.

## 2021-10-21 NOTE — END OF VISIT
[FreeTextEntry3] : This note was written by Huseyin Larios on 10/21/2021 acting solely as a scribe for Dr. Cipriano Abraham.\par  \par All medical record entries made by the Scribe were at my, Dr. Cipriano Abraham, direction and personally dictated by me on 10/21/2021. I have personally reviewed the chart and agree that the record accurately reflects my personal performance of the history, physical exam.

## 2021-11-03 ENCOUNTER — NON-APPOINTMENT (OUTPATIENT)
Age: 51
End: 2021-11-03

## 2021-11-11 ENCOUNTER — APPOINTMENT (OUTPATIENT)
Dept: ORTHOPEDIC SURGERY | Facility: CLINIC | Age: 51
End: 2021-11-11
Payer: OTHER MISCELLANEOUS

## 2021-11-11 VITALS — WEIGHT: 240 LBS | TEMPERATURE: 96.4 F | HEIGHT: 76 IN | BODY MASS INDEX: 29.22 KG/M2

## 2021-11-11 PROCEDURE — 73110 X-RAY EXAM OF WRIST: CPT | Mod: LT

## 2021-11-11 PROCEDURE — 99024 POSTOP FOLLOW-UP VISIT: CPT

## 2021-11-11 NOTE — END OF VISIT
[FreeTextEntry3] : This note was written by Huseyin Larios on 11/11/2021 acting solely as a scribe for Dr. Cipriano Abraham.\par  \par All medical record entries made by the Scribe were at my, Dr. Cipriano Abraham, direction and personally dictated by me on 11/11/2021. I have personally reviewed the chart and agree that the record accurately reflects my personal performance of the history, physical exam.

## 2021-11-11 NOTE — HISTORY OF PRESENT ILLNESS
[de-identified] : 11 weeks and 2 days postoperative. [de-identified] : 11 weeks and 2 days status post left scapholunate ligament reconstruction.  Date of surgery: 8/4/2021.  The K wires were removed 3 weeks ago.\par \par He is doing well and has minimal pain.\par \par He denies numbness and tingling in his fingers. He rates his pain a 0 out of 10 at this time. \par \par He notes stiffness in his left wrist. [de-identified] : Examination of his left wrist demonstrates his incisions to be well-healed.  There is decreased swelling.  He has full flexion and extension of the digits.  He has approximately 30 degrees of wrist flexion and extension.  There is no tenderness along the scapholunate interval.  He remains neurovascular intact distally. [de-identified] : PA, lateral and oblique radiographs of his left wrist demonstrate no widening of the scapholunate interval.  There is some dorsiflexion of the lunate on the lateral but no flexion of the scaphoid. [de-identified] : He was referred to hand therapy.  He was also instructed on a home exercise program.  He will follow-up in 4 weeks. [de-identified] : Stable, 11 weeks and 2 days postoperative.

## 2021-11-11 NOTE — ADDENDUM
[FreeTextEntry1] : I, Huseyin Larios, acted solely as a scribe for Dr. Abraham on this date on 11/11/2021.

## 2021-12-09 ENCOUNTER — APPOINTMENT (OUTPATIENT)
Dept: ORTHOPEDIC SURGERY | Facility: CLINIC | Age: 51
End: 2021-12-09
Payer: OTHER MISCELLANEOUS

## 2021-12-09 VITALS — BODY MASS INDEX: 29.22 KG/M2 | HEIGHT: 76 IN | TEMPERATURE: 97.7 F | WEIGHT: 240 LBS

## 2021-12-09 PROCEDURE — 99072 ADDL SUPL MATRL&STAF TM PHE: CPT

## 2021-12-09 PROCEDURE — 73110 X-RAY EXAM OF WRIST: CPT | Mod: LT

## 2021-12-09 PROCEDURE — 99214 OFFICE O/P EST MOD 30 MIN: CPT

## 2021-12-09 NOTE — HISTORY OF PRESENT ILLNESS
[FreeTextEntry1] : Greater than 3 months status post left wrist scapholunate ligament reconstruction.  Date of surgery: 8/4/2021.\par \par He has no complaint of pain. He states that he has been doing home exercises. He notes limited  strength. He states that he braces during the day. He states that he was not able to attend therapy as he has not yet been approved. He rates his pain a 0 out of 10 at this time.

## 2021-12-09 NOTE — ADDENDUM
[FreeTextEntry1] : I, Huseyin Larios, acted solely as a scribe for Dr. Abraham on this date on 12/09/2021.

## 2021-12-09 NOTE — DISCUSSION/SUMMARY
[FreeTextEntry1] : I had a discussion regarding today's visit, the diagnosis and treatment recommendations and options.  We also discussed changes since the last visit.  At this time, I recommended he wean off of the splint.  I will look into why he is not yet approved for hand therapy. I also recommend light duty if he returns to work. He will follow-up in 6-8 weeks.  He was given a note to return to work at the [employer] office 2 days a week.\par \par The patient has agreed to the above plan of management and has expressed full understanding.  All questions were fully answered to the patient's satisfaction.\par \par My cumulative time spent on today's visit was greater than 30 minutes and included: Preparation for the visit, review of the medical records, review of pertinent diagnostic studies, examination and counseling of the patient on the above diagnosis, treatment plan and prognosis, orders of diagnostic tests, medications and/or appropriate procedures and documentation in the medical records of today's visit.

## 2021-12-09 NOTE — END OF VISIT
[FreeTextEntry3] : This note was written by Huseyin Larios on 12/09/2021 acting solely as a scribe for Dr. Cipriano Abraham.\par  \par All medical record entries made by the Scribe were at my, Dr. Cipriano Abraham, direction and personally dictated by me on 12/09/2021. I have personally reviewed the chart and agree that the record accurately reflects my personal performance of the history, physical exam.

## 2021-12-09 NOTE — PHYSICAL EXAM
[de-identified] : - Constitutional: This is a male female in no obvious distress.  \par - Psych: Patient is alert and oriented to person, place and time.  Patient has a normal mood and affect.\par - Cardiovascular: Normal pulses throughout the upper extremities.  No significant varicosities are noted in the upper extremities. \par - Neuro: Strength and sensation are intact throughout the upper extremities.  Patient has normal coordination.\par - Respiratory:  Patient exhibits no evidence of shortness of breath or difficulty breathing.\par - Skin: No rashes, lesions, or other abnormalities are noted in the upper extremities.\par \par ---\par \par Examination of his left wrist demonstrates his incision to be well-healed.  There is decreased swelling.  He has full flexion and extension of the digits.  He has approximately 40 degrees of wrist flexion and 30 degrees of extension.  There is no tenderness along the scapholunate interval.  He remains neurovascularly intact distally. [de-identified] : PA, lateral, and oblique radiographs of his left wrist demonstrate no widening of the scapholunate ligament interval.  As previously noted, there is some dorsiflexion of the lunate on the lateral and increased scapholunate angle.

## 2022-01-13 ENCOUNTER — APPOINTMENT (OUTPATIENT)
Dept: ORTHOPEDIC SURGERY | Facility: CLINIC | Age: 52
End: 2022-01-13
Payer: OTHER MISCELLANEOUS

## 2022-01-13 VITALS — WEIGHT: 240 LBS | TEMPERATURE: 97.7 F | BODY MASS INDEX: 29.22 KG/M2 | HEIGHT: 76 IN

## 2022-01-13 PROCEDURE — 99072 ADDL SUPL MATRL&STAF TM PHE: CPT

## 2022-01-13 PROCEDURE — 99214 OFFICE O/P EST MOD 30 MIN: CPT

## 2022-01-13 NOTE — HISTORY OF PRESENT ILLNESS
[FreeTextEntry1] : Workmen's Compensation case\par Working: Yes.  Light duty\par \par Greater than 5 months status post left wrist scapholunate ligament reconstruction.  Date of surgery: 8/4/2021.\par \par He is doing well.  He rates his pain as 1 out of 10.  He has still not yet started occupational therapy, as he has not been able to get an appointment.

## 2022-01-13 NOTE — PHYSICAL EXAM
[de-identified] : - Constitutional: This is a male female in no obvious distress.  \par - Psych: Patient is alert and oriented to person, place and time.  Patient has a normal mood and affect.\par - Cardiovascular: Normal pulses throughout the upper extremities.  No significant varicosities are noted in the upper extremities. \par - Neuro: Strength and sensation are intact throughout the upper extremities.  Patient has normal coordination.\par - Respiratory:  Patient exhibits no evidence of shortness of breath or difficulty breathing.\par - Skin: No rashes, lesions, or other abnormalities are noted in the upper extremities.\par \par ---\par \par Examination of his left wrist demonstrates his incision to be well-healed.  There is no residual swelling.  He has full flexion and extension of the digits.  He has approximately 45 degrees of wrist flexion and 30 degrees of extension.  There is no tenderness along the scapholunate interval.  There is no evidence of instability.  He remains neurovascularly intact distally. [de-identified] : Most recent PA, lateral, and oblique radiographs of his left wrist demonstrated no widening of the scapholunate ligament interval.  As previously noted, there is some dorsiflexion of the lunate on the lateral and increased scapholunate angle.

## 2022-01-13 NOTE — DISCUSSION/SUMMARY
[FreeTextEntry1] : I had a discussion regarding today's visit, the diagnosis and treatment recommendations and options.  We also discussed changes since the last visit.  \par \par He will begin therapy.  I do think it is important for him to begin strengthening.  He will continue working at light duty.  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  He will follow-up in 2 to 3 months according to his symptoms.\par \par The patient has agreed to the above plan of management and has expressed full understanding.  All questions were fully answered to the patient's satisfaction.\par \par My cumulative time spent on today's visit was greater than 30 minutes and included: Preparation for the visit, review of the medical records, review of pertinent diagnostic studies, examination and counseling of the patient on the above diagnosis, treatment plan and prognosis, orders of diagnostic tests, medications and/or appropriate procedures and documentation in the medical records of today's visit.

## 2022-01-24 ENCOUNTER — OUTPATIENT (OUTPATIENT)
Dept: OUTPATIENT SERVICES | Facility: HOSPITAL | Age: 52
LOS: 1 days | End: 2022-01-24

## 2022-01-24 DIAGNOSIS — Z98.890 OTHER SPECIFIED POSTPROCEDURAL STATES: Chronic | ICD-10-CM

## 2022-01-24 DIAGNOSIS — K08.409 PARTIAL LOSS OF TEETH, UNSPECIFIED CAUSE, UNSPECIFIED CLASS: Chronic | ICD-10-CM

## 2022-01-24 DIAGNOSIS — Z90.89 ACQUIRED ABSENCE OF OTHER ORGANS: Chronic | ICD-10-CM

## 2022-01-27 DIAGNOSIS — S63.502A UNSPECIFIED SPRAIN OF LEFT WRIST, INITIAL ENCOUNTER: ICD-10-CM

## 2022-02-23 ENCOUNTER — NON-APPOINTMENT (OUTPATIENT)
Age: 52
End: 2022-02-23

## 2022-03-03 ENCOUNTER — APPOINTMENT (OUTPATIENT)
Dept: ORTHOPEDIC SURGERY | Facility: CLINIC | Age: 52
End: 2022-03-03
Payer: OTHER MISCELLANEOUS

## 2022-03-03 VITALS — BODY MASS INDEX: 29.22 KG/M2 | WEIGHT: 240 LBS | HEIGHT: 76 IN

## 2022-03-03 PROCEDURE — 99214 OFFICE O/P EST MOD 30 MIN: CPT

## 2022-03-03 PROCEDURE — 99072 ADDL SUPL MATRL&STAF TM PHE: CPT

## 2022-03-03 NOTE — ADDENDUM
[FreeTextEntry1] : I, Huseyin Larios, acted solely as a scribe for Dr. Abraham on this date on 03/03/2022.

## 2022-03-03 NOTE — HISTORY OF PRESENT ILLNESS
[FreeTextEntry1] : Workmen's Compensation case\par Working: Yes.  Light duty\par \par 7 months status post left wrist scapholunate ligament reconstruction.  Date of surgery: 8/4/2021.\par \par See note from when he was last seen in the office 7 weeks ago.  He was referred to occupational therapy, mostly for strengthening.\par \par He starts that he finished therapy. He notes clicking in his wrist when he is performing gripping exercise. He rates his pain a 0 out of 10, but occasionally a 1 out of 10. He takes Tylenol for the pain.

## 2022-03-03 NOTE — PHYSICAL EXAM
[de-identified] : - Constitutional: This is a male female in no obvious distress.  \par - Psych: Patient is alert and oriented to person, place and time.  Patient has a normal mood and affect.\par - Cardiovascular: Normal pulses throughout the upper extremities.  No significant varicosities are noted in the upper extremities. \par - Neuro: Strength and sensation are intact throughout the upper extremities.  Patient has normal coordination.\par - Respiratory:  Patient exhibits no evidence of shortness of breath or difficulty breathing.\par - Skin: No rashes, lesions, or other abnormalities are noted in the upper extremities.\par \par ---\par \par Examination of his left wrist demonstrates his incision to be well-healed.  There is no residual swelling.  He has full flexion and extension of the digits.  He has approximately 40 degrees of wrist flexion and 35 degrees of extension.  There is no tenderness along the scapholunate interval.  There is no evidence of instability.  There is a negative Davis sign.  He remains neurovascularly intact distally. [de-identified] : Most recent PA, lateral, and oblique radiographs of his left wrist demonstrated no widening of the scapholunate ligament interval.  As previously noted, there is some dorsiflexion of the lunate on the lateral and increased scapholunate angle.

## 2022-03-03 NOTE — DISCUSSION/SUMMARY
[FreeTextEntry1] : I had a discussion regarding today's visit, the diagnosis and treatment recommendations and options.  We also discussed changes since the last visit.  At this time, I recommended observation with activity modification with regard to gripping exercises. He will follow up on an as needed basis.\par \par The patient has agreed to the above plan of management and has expressed full understanding.  All questions were fully answered to the patient's satisfaction.\par \par My cumulative time spent on today's visit was greater than 30 minutes and included: Preparation for the visit, review of the medical records, review of pertinent diagnostic studies, examination and counseling of the patient on the above diagnosis, treatment plan and prognosis, orders of diagnostic tests, medications and/or appropriate procedures and documentation in the medical records of today's visit.

## 2022-03-28 NOTE — H&P PST ADULT - GENERAL
[facility] Medicine  History & Physical    Patient Name: Antonia Polk  MRN: [id number]  Admission Date: 3/27/2022  Attending Physician: Jonathan Hopper MD   Primary Care Provider: Jose Phillips MD         Patient information was obtained from patient and ER records.       Subjective:     Principal Problem:Acute on chronic combined systolic and diastolic congestive heart failure    Chief Complaint:   Chief Complaint   Patient presents with    Shortness of Breath    Tachycardia        HPI: History of Present Illness:  Patient is a 64 y.o. female who has a past medical history of Coronary artery disease, Diabetes mellitus, Hyperlipidemia, Hypertension, MI (myocardial infarction), Neuropathy, Plantar fasciitis, and Thyroid disease presented with dyspnea on exertion and shortness of breath. She was recently discharged from the hospital after being admitted and treated for decompensated heart failure. After discharge patient states she went home and noticed that her ankles were swollen more than when she left the hospital. She was prescribed lasix at discharge and took her medications however she did not notice that she urinated much with the pills. She presented to ED and was found to be tachypneic and hypoxic with oxygen sats around 87% on RA. Chest xray with bilateral opacities similar to previous. BNP at baseline. She is not on home oxygen. She was treated with IV lasix in ED and placed on supplemental oxygen. She states she feels better and her oxygenation improved to 96%. Patient currently denies any fever/chills, chest pain, weakness, numbness, abdominal pain, nausea/vomiting, dysuria/hematuria, or weight loss.       Past Medical History:   Diagnosis Date    Coronary artery disease     Diabetes mellitus     Hyperlipidemia     Hypertension     MI (myocardial infarction)     Neuropathy     Plantar fasciitis     Thyroid disease        Past Surgical History:   Procedure Laterality Date     A-V CARDIAC PACEMAKER INSERTION      HYSTERECTOMY      INSERTION OF PACEMAKER      REMOVAL OF IMPLANTED CARDIOVERTER-DEFIBRILLATOR (ICD)         Review of patient's allergies indicates:   Allergen Reactions    Demerol [meperidine]     Diclofenac      Itching., GI issues.        Current Facility-Administered Medications on File Prior to Encounter   Medication    [DISCONTINUED] acetaminophen tablet 650 mg    [DISCONTINUED] albuterol-ipratropium 2.5 mg-0.5 mg/3 mL nebulizer solution 3 mL    [DISCONTINUED] aluminum-magnesium hydroxide-simethicone 200-200-20 mg/5 mL suspension 30 mL    [DISCONTINUED] amiodarone tablet 200 mg    [DISCONTINUED] aspirin EC tablet 81 mg    [DISCONTINUED] atorvastatin tablet 80 mg    [DISCONTINUED] buPROPion TBSR 12 hr tablet 150 mg    [DISCONTINUED] dextrose 50% injection 12.5 g    [DISCONTINUED] dextrose 50% injection 25 g    [DISCONTINUED] enoxaparin injection 40 mg    [DISCONTINUED] furosemide injection 40 mg    [DISCONTINUED] gabapentin capsule 300 mg    [DISCONTINUED] glucagon (human recombinant) injection 1 mg    [DISCONTINUED] glucose chewable tablet 16 g    [DISCONTINUED] glucose chewable tablet 24 g    [DISCONTINUED] HYDROcodone-acetaminophen  mg per tablet 1 tablet    [DISCONTINUED] levothyroxine tablet 50 mcg    [DISCONTINUED] magnesium oxide tablet 800 mg    [DISCONTINUED] magnesium oxide tablet 800 mg    [DISCONTINUED] metoprolol tartrate (LOPRESSOR) tablet 25 mg    [DISCONTINUED] morphine injection 2 mg    [DISCONTINUED] naloxone 0.4 mg/mL injection 0.02 mg    [DISCONTINUED] nitroGLYCERIN SL tablet 0.4 mg    [DISCONTINUED] ondansetron injection 4 mg    [DISCONTINUED] pantoprazole EC tablet 40 mg    [DISCONTINUED] polyethylene glycol packet 17 g    [DISCONTINUED] potassium, sodium phosphates 280-160-250 mg packet 2 packet    [DISCONTINUED] potassium, sodium phosphates 280-160-250 mg packet 2 packet    [DISCONTINUED] potassium, sodium  phosphates 280-160-250 mg packet 2 packet    [DISCONTINUED] promethazine tablet 25 mg    [DISCONTINUED] sodium chloride 0.9% flush 10 mL    [DISCONTINUED] sucralfate 100 mg/mL suspension 1 g    [DISCONTINUED] trazodone split tablet 25 mg     Current Outpatient Medications on File Prior to Encounter   Medication Sig    albuterol (PROVENTIL/VENTOLIN HFA) 90 mcg/actuation inhaler Inhale 1-2 puffs into the lungs every 4 (four) hours as needed for Wheezing or Shortness of Breath. Rescue    amiodarone (PACERONE) 200 MG Tab Take by mouth once daily.    aspirin (ECOTRIN) 81 MG EC tablet Take 81 mg by mouth once daily.    buPROPion (WELLBUTRIN XL) 150 MG TB24 tablet   1 tab, Oral, every 24 hours, # 90 tab, 3 Refill(s), Pharmacy: [id number], 173, cm, 21 13:29:00 CDT, Height/Length Measured, 98.4, kg, 21 14:36:00 CST, Weight Dosing    empagliflozin (JARDIANCE) 25 mg tablet Take 25 mg by mouth once daily.    ezetimibe (ZETIA) 10 mg tablet   See Instructions, Take 1 tablet by mouth once daily for 90 days, # 90 tab, 1 Refill(s), Pharmacy: [id number], 173, cm, 21 10:41:00 CST, Height/Length Measured, 104.5, kg, 21 10:41:00 CST, Weight Dosing    furosemide (LASIX) 20 MG tablet 20 mg.    furosemide (LASIX) 20 MG tablet Take 1 tablet (20 mg total) by mouth 2 (two) times daily. for 5 days    gabapentin (NEURONTIN) 300 MG capsule Take 300 mg by mouth once daily.    levothyroxine (SYNTHROID) 50 MCG tablet   = 1 tab, Oral, Daily, # 60 tab, 5 Refill(s), Pharmacy: [id number], 173, cm, 10/07/21 16:04:00 CDT, Height/Length Measured, 104.8, kg, 10/07/21 16:04:00 CDT, Weight Dosing    meloxicam (MOBIC) 15 MG tablet Take 1 tablet (15 mg total) by mouth once daily. for 14 days    metoprolol tartrate (LOPRESSOR) 25 MG tablet Take 25 mg by mouth 2 (two) times daily.    nitroGLYCERIN (NITROSTAT) 0.4 MG SL tablet SMARTSI Tablet(s) Sublingual PRN    potassium chloride SA  (K-DUR,KLOR-CON) 20 MEQ tablet Take 1 tablet (20 mEq total) by mouth once daily. for 5 days    rosuvastatin (CRESTOR) 40 MG Tab Take 40 mg by mouth once daily.    sacubitriL-valsartan (ENTRESTO) 49-51 mg per tablet   1 tab, Oral, BID, # 60 tab, 0 Refill(s), Pharmacy: [id number], 173, cm, 11/23/21 10:41:00 CST, Height/Length Measured, 104.5, kg, 11/23/21 10:41:00 CST, Weight Dosing    hyoscyamine (ANASPAZ,LEVSIN) 0.125 mg Tab Take 1 tablet (125 mcg total) by mouth every 4 (four) hours as needed (abdominal cramping).    ondansetron (ZOFRAN-ODT) 8 MG TbDL 8 mg.     Family History    None       Tobacco Use    Smoking status: Never Smoker    Smokeless tobacco: Never Used   Substance and Sexual Activity    Alcohol use: Yes     Comment: occ    Drug use: Never    Sexual activity: Yes     Birth control/protection: See Surgical Hx     Review of Systems   Constitutional:  Positive for activity change and fatigue. Negative for chills and fever.   HENT:  Negative for congestion, facial swelling, hearing loss and trouble swallowing.    Eyes:  Negative for photophobia and visual disturbance.   Respiratory:  Positive for shortness of breath. Negative for chest tightness and wheezing.    Cardiovascular:  Negative for chest pain, palpitations and leg swelling.   Gastrointestinal:  Negative for abdominal pain, blood in stool, constipation, diarrhea, nausea and vomiting.   Endocrine: Negative.    Genitourinary: Negative.    Musculoskeletal:  Negative for back pain, joint swelling and myalgias.   Skin: Negative.    Allergic/Immunologic: Negative.    Neurological:  Negative for dizziness, facial asymmetry, speech difficulty, weakness and numbness.   Hematological: Negative.    Psychiatric/Behavioral:  Negative for agitation, confusion and dysphoric mood. The patient is not nervous/anxious.    Objective:     Vital Signs (Most Recent):  Temp: 98.5 °F (36.9 °C) (03/27/22 2038)  Pulse: 63 (03/27/22 2130)  Resp: (!) 22  (03/27/22 2130)  BP: (!) 143/76 (03/27/22 2130)  SpO2: 95 % (03/27/22 2130)   Vital Signs (24h Range):  Temp:  [96.9 °F (36.1 °C)-98.9 °F (37.2 °C)] 98.5 °F (36.9 °C)  Pulse:  [61-74] 63  Resp:  [17-27] 22  SpO2:  [87 %-96 %] 95 %  BP: (128-162)/(65-93) 143/76     Weight: 102.1 kg (225 lb)  Body mass index is 34.21 kg/m².    Physical Exam  Vitals and nursing note reviewed.   Constitutional:       General: She is awake. She is not in acute distress.     Appearance: Normal appearance. She is well-developed and well-groomed. She is obese. She is not ill-appearing.      Interventions: Nasal cannula in place.   HENT:      Head: Normocephalic and atraumatic.   Eyes:      General: No scleral icterus.  Cardiovascular:      Rate and Rhythm: Normal rate.   Pulmonary:      Effort: No respiratory distress.   Musculoskeletal:      Right lower leg: No edema.      Left lower leg: No edema.   Skin:     Coloration: Skin is not jaundiced.   Neurological:      General: No focal deficit present.      Mental Status: She is alert and oriented to person, place, and time. Mental status is at baseline.   Psychiatric:         Mood and Affect: Mood normal.         Behavior: Behavior normal. Behavior is cooperative.         Thought Content: Thought content normal.         Judgment: Judgment normal.           Significant Labs: All pertinent labs within the past 24 hours have been reviewed.    Significant Imaging: I have reviewed all pertinent imaging results/findings within the past 24 hours.    Assessment/Plan:     * Acute on chronic combined systolic and diastolic congestive heart failure  No results found for this or any previous visit.    Recent Labs   Lab 03/27/22 2047   *     Chest Xray: with bilateral pulmonary edema  Start IV Lasix  Cont BB  Cont to monitor I/O's and daily weights.  Fluid restriction (2 liters/24 hours)  Low Na diet  Monitor for signs of fluid overload: RR>30, O2 sat<92%, weight gain of >3 lbs, or urinary output  <160ml/8hr  Maintain oxygen sats >92% via NC if supplemental oxygen needed.     Patient Vitals for the past 72 hrs (Last 3 readings):   Weight   03/27/22 2026 102.1 kg (225 lb)       Hypertension  BP Readings from Last 3 Encounters:   03/27/22 (!) 143/76   03/27/22 (!) 147/71   03/26/22 (!) 153/88     Continuing home medications as prescribed  Will cont to monitor and adjust as needed  Will utilize p.r.n. blood pressure medication only if patient's blood pressure greater than 180/110 and she develops symptoms such as worsening chest pain or shortness of breath.  Cardiac diet    Cardiac/Autonomic (From admission, onward)            Start     Stop Route Frequency Ordered    03/28/22 0900  amiodarone tablet 200 mg         -- Oral Daily 03/27/22 2150 03/27/22 2200  metoprolol tartrate (LOPRESSOR) tablet 25 mg         -- Oral 2 times daily 03/27/22 2150 03/27/22 2200  atorvastatin tablet 80 mg         -- Oral Nightly 03/27/22 2150          Hyperlipidemia  No results found for: LDLCALC   Patient is chronically on statin  Continue home medication  Monitor for acute changes      Hypothyroid  No results found for: TSH, P5OSTJV, J3OLBNC, THYROIDAB, FREET4  Patient has chronic hypothyroidism.   Cont with current dose of levothyroxine to treat   Repeat TSH as outpatient with PCP    Coronary artery disease involving native coronary artery of native heart without angina pectoris  Patient with known CAD   Will continue ASA and Statin  Monitor for S/Sx of angina/ACS.   Continue to monitor on telemetry.         Type 2 diabetes mellitus without complication, without long-term current use of insulin  No results found for: LABA1C, HGBA1C  No results for input(s): POCTGLUCOSE in the last 24 hours.     Low dose correction scale   Cont blood glucose monitoring   BG goal:  Preprandial blood glucose target <140 mg/dL  Random glucoses <180 mg/dL  Avoid hypoglycemia -  Reduce antihyperglycemic therapy when caloric intake is reduced.  Avoid insulin stacking as a result of repeated injection of prandial insulin at close intervals.   Avoid severe hyperglycemia  ADA diet  Antihyperglycemics (From admission, onward)            Start     Stop Route Frequency Ordered    03/27/22 2250  insulin aspart U-100 pen 0-5 Units         -- SubQ Before meals & nightly PRN 03/27/22 2150             VTE Risk Mitigation (From admission, onward)         Ordered     enoxaparin injection 40 mg  Daily         03/27/22 2150     IP VTE LOW RISK PATIENT  Once         03/27/22 2150                   The attending portion of this evaluation, treatment, and documentation was performed per Jose Aburto MD via Telemedicine AudioVisual using the secure [employer] software platform with 2 way audio/video. The provider was located off-site and the patient is located in the hospital. The aforementioned video software was utilized to document the relevant history and physical exam      Jose Aburto MD  Department of Hospital Medicine   [facility] Emergency Dept   negative

## 2022-04-26 NOTE — DISCUSSION/SUMMARY
[FreeTextEntry1] : He has findings consistent with a left wrist sprain, status post an injury at work on 3/4/2021.\par \par I had a discussion regarding today's visit, the diagnosis, and treatment recommendations / options. At this time, given his persistent symptoms, I recommended an MRI to evaluate  his left wrist.  I would like to rule out a scapholunate ligament injury.  He will follow up after his MRI to review the results and discuss treatment recommendations. \par \par The patient has agreed to this plan of management and has expressed full understanding.  All questions were fully answered to the patient's satisfaction.\par \par I spent at least 30 minutes in total on this patient's visit. This included: Preparation for the visit, review of the medical records, review of pertinent diagnostic studies, examination and counseling of the patient on the above diagnosis, treatment plan and prognosis, orders of diagnostic tests, medication and/or appropriate procedures and documentation in the medical records of today's visit.  Not applicable

## 2022-05-05 ENCOUNTER — APPOINTMENT (OUTPATIENT)
Dept: ORTHOPEDIC SURGERY | Facility: CLINIC | Age: 52
End: 2022-05-05
Payer: OTHER MISCELLANEOUS

## 2022-05-05 VITALS — BODY MASS INDEX: 29.22 KG/M2 | WEIGHT: 240 LBS | HEIGHT: 76 IN

## 2022-05-05 PROCEDURE — 99213 OFFICE O/P EST LOW 20 MIN: CPT

## 2022-05-05 PROCEDURE — 99072 ADDL SUPL MATRL&STAF TM PHE: CPT

## 2022-05-05 NOTE — ADDENDUM
[FreeTextEntry1] : I, Huseyin Larios, acted solely as a scribe for Dr. Abraham on this date on 05/05/2022.

## 2022-05-05 NOTE — HISTORY OF PRESENT ILLNESS
[FreeTextEntry1] : Workmen's Compensation case\par Working: Yes.  Light duty\par \par 9 months status post left wrist scapholunate ligament reconstruction.  Date of surgery: 8/4/2021.\par \par See note from when he was last seen in the office 2 months ago.\par \par He returns today due to work giving him a hard time about evaluations. He notes no changes since last visit.

## 2022-05-05 NOTE — DISCUSSION/SUMMARY
[FreeTextEntry1] : I had a discussion regarding today's visit, the diagnosis and treatment recommendations and options.  We also discussed changes since the last visit.  At this time, I recommended continued observation with activity modification at work.  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  He will follow-up again when he needs me to fill out the forms required.\par \par The patient has agreed to the above plan of management and has expressed full understanding.  All questions were fully answered to the patient's satisfaction.\par \par My cumulative time spent on today's visit was approximately 30 minutes and included: Preparation for the visit, review of the medical records, review of pertinent diagnostic studies, examination and counseling of the patient on the above diagnosis, treatment plan and prognosis, orders of diagnostic tests, medications and/or appropriate procedures and documentation in the medical records of today's visit.

## 2022-05-05 NOTE — PHYSICAL EXAM
[de-identified] : - Constitutional: This is a male female in no obvious distress.  \par - Psych: Patient is alert and oriented to person, place and time.  Patient has a normal mood and affect.\par - Cardiovascular: Normal pulses throughout the upper extremities.  No significant varicosities are noted in the upper extremities. \par - Neuro: Strength and sensation are intact throughout the upper extremities.  Patient has normal coordination.\par - Respiratory:  Patient exhibits no evidence of shortness of breath or difficulty breathing.\par - Skin: No rashes, lesions, or other abnormalities are noted in the upper extremities.\par \par ---\par \par Examination of his left wrist demonstrates his incision to be well-healed.  There is mild residual swelling.  He has full flexion and extension of the digits.  He has approximately 35 degrees of wrist flexion and 30 degrees of extension.  There is no tenderness along the scapholunate interval.  There is no evidence of instability.  There is a negative Davis sign.  He remains neurovascularly intact distally. [de-identified] : Most recent PA, lateral, and oblique radiographs of his left wrist demonstrated no widening of the scapholunate ligament interval.  As previously noted, there is some dorsiflexion of the lunate on the lateral and increased scapholunate angle.

## 2022-05-05 NOTE — END OF VISIT
[FreeTextEntry3] : This note was written by Huseyin Larios on 05/05/2022 acting solely as a scribe for Dr. Cipriano Abraham.\par  \par All medical record entries made by the Scribe were at my, Dr. Cipriano Abraham, direction and personally dictated by me on 05/05/2022. I have personally reviewed the chart and agree that the record accurately reflects my personal performance of the history, physical exam.

## 2022-06-23 ENCOUNTER — APPOINTMENT (OUTPATIENT)
Dept: ORTHOPEDIC SURGERY | Facility: CLINIC | Age: 52
End: 2022-06-23

## 2022-06-23 VITALS — HEIGHT: 76 IN | BODY MASS INDEX: 29.22 KG/M2 | WEIGHT: 240 LBS

## 2022-06-23 PROCEDURE — 99072 ADDL SUPL MATRL&STAF TM PHE: CPT

## 2022-06-23 PROCEDURE — 73110 X-RAY EXAM OF WRIST: CPT | Mod: LT

## 2022-06-23 PROCEDURE — 99214 OFFICE O/P EST MOD 30 MIN: CPT

## 2022-06-24 NOTE — ADDENDUM
[FreeTextEntry1] : I, Huseyin Larios, acted solely as a scribe for Dr. Abraham on this date on 06/23/2022.

## 2022-06-24 NOTE — DISCUSSION/SUMMARY
[FreeTextEntry1] : I had a discussion regarding today's visit, the diagnosis and treatment recommendations and options.  We also discussed changes since the last visit.  \par \par At this time, as the surgery was almost 11 months ago, I do believe that he has reached maximum medical improvement and is amenable to a scheduled loss of use.\par \par Based upon the [facility] Worker's Compensation Board medical guidelines, I find a 20 % schedule loss of use of his left hand.  This is based upon his mobility deficits and the outlined guidelines.\par \par The patient has agreed to the above plan of management and has expressed full understanding.  All questions were fully answered to the patient's satisfaction.\par \par My cumulative time spent on today's visit was approximately 30 minutes and included: Preparation for the visit, review of the medical records, review of pertinent diagnostic studies, examination and counseling of the patient on the above diagnosis, treatment plan and prognosis, orders of diagnostic tests, medications and/or appropriate procedures and documentation in the medical records of today's visit.

## 2022-06-24 NOTE — PHYSICAL EXAM
[de-identified] : - Constitutional: This is a male female in no obvious distress.  \par - Psych: Patient is alert and oriented to person, place and time.  Patient has a normal mood and affect.\par - Cardiovascular: Normal pulses throughout the upper extremities.  No significant varicosities are noted in the upper extremities. \par - Neuro: Strength and sensation are intact throughout the upper extremities.  Patient has normal coordination.\par - Respiratory:  Patient exhibits no evidence of shortness of breath or difficulty breathing.\par - Skin: No rashes, lesions, or other abnormalities are noted in the upper extremities.\par \par ---\par \par Examination of his left wrist demonstrates his incision to be well-healed.  There is mild residual swelling.  He has full flexion and extension of the digits.  He has 25 degrees of wrist flexion and 35 degrees of extension.  He has 80 degrees of pronation supination.  There is very minimal tenderness along the scapholunate interval.  There is no evidence of instability.  There is a negative Davis sign.  He remains neurovascularly intact distally. [de-identified] : PA, lateral, and oblique radiographs of his left wrist demonstrated no widening of the scapholunate ligament interval.  As previously noted, there is some dorsiflexion of the lunate on the lateral and increased scapholunate angle.  There are also cystic changes within the scaphoid and lunate noted.

## 2022-06-24 NOTE — HISTORY OF PRESENT ILLNESS
[FreeTextEntry1] : Workmen's Compensation case\par Working: Yes.  Light duty\par \par Follow-up regarding left wrist scapholunate ligament reconstruction on 8/4/2021.\par \par He returns today in follow-up.  He is in today with left wrist decreased mobility. He states that he is doing exercises in therapy with no relief. He takes Tylenol, which helps. 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 He went to [facility] and he states that his evaluation is that he lost 9 percent of use. He has a  and he is looking to settle in the next 1 to 2 months.

## 2022-07-21 ENCOUNTER — APPOINTMENT (OUTPATIENT)
Dept: ORTHOPEDIC SURGERY | Facility: CLINIC | Age: 52
End: 2022-07-21

## 2022-07-21 VITALS — HEIGHT: 76 IN | WEIGHT: 240 LBS | BODY MASS INDEX: 29.22 KG/M2

## 2022-07-21 PROCEDURE — 99072 ADDL SUPL MATRL&STAF TM PHE: CPT

## 2022-07-21 PROCEDURE — 99214 OFFICE O/P EST MOD 30 MIN: CPT

## 2022-07-21 NOTE — ADDENDUM
[FreeTextEntry1] : I, Huseyin Larios, acted solely as a scribe for Dr. Abraham on this date on 07/21/2022.

## 2022-07-21 NOTE — PHYSICAL EXAM
[de-identified] : - Constitutional: This is a male female in no obvious distress.  \par - Psych: Patient is alert and oriented to person, place and time.  Patient has a normal mood and affect.\par - Cardiovascular: Normal pulses throughout the upper extremities.  No significant varicosities are noted in the upper extremities. \par - Neuro: Strength and sensation are intact throughout the upper extremities.  Patient has normal coordination.\par - Respiratory:  Patient exhibits no evidence of shortness of breath or difficulty breathing.\par - Skin: No rashes, lesions, or other abnormalities are noted in the upper extremities.\par \par ---\par \par Examination of his left wrist demonstrates his incision to be well-healed.  There is mild residual swelling.  He has full flexion and extension of the digits.  He has 25 degrees of wrist flexion and 35 degrees of extension.  He has 80 degrees of pronation supination.  There is very minimal tenderness along the scapholunate interval.  There is no evidence of instability.  There is a negative Davis sign.  He remains neurovascularly intact distally. [de-identified] : Recent PA, lateral, and oblique radiographs of his left wrist demonstrated no widening of the scapholunate ligament interval.  As previously noted, there is some dorsiflexion of the lunate on the lateral and increased scapholunate angle.  There are also cystic changes within the scaphoid and lunate noted.

## 2022-07-21 NOTE — END OF VISIT
[FreeTextEntry3] : This note was written by Huseyin Larios on 07/21/2022 acting solely as a scribe for Dr. Cipriano Abraham.\par  \par All medical record entries made by the Scribe were at my, Dr. Cipriano Abraham, direction and personally dictated by me on 07/21/2022. I have personally reviewed the chart and agree that the record accurately reflects my personal performance of the history, physical exam.

## 2022-07-21 NOTE — HISTORY OF PRESENT ILLNESS
[FreeTextEntry1] : Workmen's Compensation case\par Working: Yes.  Light duty\par \par Follow-up regarding left wrist scapholunate ligament reconstruction on 8/4/2021.\par \par See note from when he was seen in the office 4 weeks ago.  He was found to have a 20% schedule loss of use of his left hand.\par \par He returns today with left wrist pain. He notes that the pain is worse with overuse. He states that his range of motion continues to be limited. He is doing exercises he learned in therapy. He needs his forms filled out.

## 2022-07-21 NOTE — DISCUSSION/SUMMARY
[FreeTextEntry1] : I had a discussion regarding today's visit, the diagnosis and treatment recommendations and options.  We also discussed changes since the last visit.  \par \par I had a discussion regarding today's visit, the diagnosis and treatment recommendations and options.  We also discussed changes since the last visit.  \par \par We will continue exercises on his own.  I filled out a form for the [facility] [employer] documenting his limitations.  We will follow-up every 4 months to have the form filled out, unless there are changes in the interim.  I told him that he can send the form over the other months and we will fill them out as long as there are no changes in his condition.\par \par The patient has agreed to the above plan of management and has expressed full understanding.  All questions were fully answered to the patient's satisfaction.\par \par My cumulative time spent on today's visit was greater than 30 minutes and included: Preparation for the visit, review of the medical records, review of pertinent diagnostic studies, examination and counseling of the patient on the above diagnosis, treatment plan and prognosis, orders of diagnostic tests, medications and/or appropriate procedures and documentation in the medical records of today's visit.

## 2022-09-20 NOTE — BRIEF OPERATIVE NOTE - NSICDXBRIEFOPLAUNCH_GEN_ALL_CORE
Quality 130: Documentation Of Current Medications In The Medical Record: Current Medications Documented
Quality 110: Preventive Care And Screening: Influenza Immunization: Influenza Immunization Administered during Influenza season
Detail Level: Generalized
Quality 265: Biopsy Follow-Up: Biopsy results reviewed, communicated, tracked, and documented
<--- Click to Launch ICDx for PreOp, PostOp and Procedure

## 2022-10-23 NOTE — H&P PST ADULT - PRO ARRIVE FROM
REVIEW OF SYSTEMS  General:	complaining of general malaise  Respiratory and Thorax: complaining of SOB  Cardiovascular:	S1, S2 WNL. + 1 edema    Gastrointestinal:	    Genitourinary:	    Musculoskeletal:	    Neurological:	    Psychiatric:	    Hematology/Lymphatics:	    Endocrine:	    Allergic/Immunologic: REVIEW OF SYSTEMS  General:	complaining of general malaise  Respiratory and Thorax: complaining of SOB  Cardiovascular:	chest tightness  Gastrointestinal:	vomiting x 7 days  Genitourinary:	denies home

## 2022-11-09 ENCOUNTER — NON-APPOINTMENT (OUTPATIENT)
Age: 52
End: 2022-11-09

## 2022-11-10 PROBLEM — S63.8X2A TEAR OF LEFT SCAPHOLUNATE LIGAMENT: Status: ACTIVE | Noted: 2021-05-20

## 2022-11-17 ENCOUNTER — APPOINTMENT (OUTPATIENT)
Dept: ORTHOPEDIC SURGERY | Facility: CLINIC | Age: 52
End: 2022-11-17

## 2022-11-17 DIAGNOSIS — S63.8X2A SPRAIN OF OTHER PART OF LEFT WRIST AND HAND, INITIAL ENCOUNTER: ICD-10-CM

## 2023-04-07 ENCOUNTER — NON-APPOINTMENT (OUTPATIENT)
Age: 53
End: 2023-04-07

## 2023-04-07 ENCOUNTER — APPOINTMENT (OUTPATIENT)
Dept: UROLOGY | Facility: CLINIC | Age: 53
End: 2023-04-07
Payer: COMMERCIAL

## 2023-04-07 VITALS
OXYGEN SATURATION: 97 % | HEIGHT: 76 IN | RESPIRATION RATE: 16 BRPM | BODY MASS INDEX: 29.83 KG/M2 | SYSTOLIC BLOOD PRESSURE: 131 MMHG | DIASTOLIC BLOOD PRESSURE: 81 MMHG | WEIGHT: 245 LBS | TEMPERATURE: 97.5 F | HEART RATE: 73 BPM

## 2023-04-07 PROCEDURE — 99204 OFFICE O/P NEW MOD 45 MIN: CPT

## 2023-04-07 RX ORDER — MELOXICAM 15 MG/1
15 TABLET ORAL DAILY
Qty: 30 | Refills: 1 | Status: DISCONTINUED | COMMUNITY
Start: 2022-06-23 | End: 2023-04-07

## 2023-04-07 NOTE — PHYSICAL EXAM
[General Appearance - Well Developed] : well developed [General Appearance - Well Nourished] : well nourished [Normal Appearance] : normal appearance [Well Groomed] : well groomed [General Appearance - In No Acute Distress] : no acute distress [Edema] : no peripheral edema [Respiration, Rhythm And Depth] : normal respiratory rhythm and effort [Exaggerated Use Of Accessory Muscles For Inspiration] : no accessory muscle use [Abdomen Soft] : soft [Abdomen Tenderness] : non-tender [Costovertebral Angle Tenderness] : no ~M costovertebral angle tenderness [FreeTextEntry1] :  exame refussed. [Normal Station and Gait] : the gait and station were normal for the patient's age [] : no rash [No Focal Deficits] : no focal deficits [Oriented To Time, Place, And Person] : oriented to person, place, and time [Affect] : the affect was normal [Mood] : the mood was normal [Not Anxious] : not anxious

## 2023-04-07 NOTE — HISTORY OF PRESENT ILLNESS
[FreeTextEntry1] : Mr. ELIZABETH NUNEZ 52 year old  M  no PMH and PSH left wrist. Pt comes in bc of decreased urinary flow, freq, nocturia. Pt having ED. Pt rates erection1.5/10. Feels it happened since after taking his COVID vacine in December of 2022. Pt has tried HIMS with little help. Nonsmoker. Sister ovarian cancer. \par \par 3/20/23 PSA  0.91

## 2023-04-07 NOTE — LETTER BODY
[Dear  ___] : Dear  [unfilled], [Consult Letter:] : I had the pleasure of evaluating your patient, [unfilled]. [Please see my note below.] : Please see my note below. [Consult Closing:] : Thank you very much for allowing me to participate in the care of this patient.  If you have any questions, please do not hesitate to contact me. [Sincerely,] : Sincerely, [FreeTextEntry3] : Donovan Osman, DO\par Genitourinary Medicine\par

## 2023-04-07 NOTE — ASSESSMENT
[FreeTextEntry1] : Plan\par UA\par culture\par Testosterone\par LH\par start cialis 5mg\par fu 2 weeks

## 2023-04-10 LAB
APPEARANCE: CLEAR
BACTERIA UR CULT: NORMAL
BACTERIA: NEGATIVE
BILIRUBIN URINE: NEGATIVE
BLOOD URINE: NEGATIVE
COLOR: NORMAL
GLUCOSE QUALITATIVE U: NEGATIVE
HYALINE CASTS: 0 /LPF
KETONES URINE: NEGATIVE
LEUKOCYTE ESTERASE URINE: NEGATIVE
MICROSCOPIC-UA: NORMAL
NITRITE URINE: NEGATIVE
PH URINE: 6.5
PROTEIN URINE: NEGATIVE
RED BLOOD CELLS URINE: 0 /HPF
SPECIFIC GRAVITY URINE: 1.01
SQUAMOUS EPITHELIAL CELLS: 0 /HPF
UROBILINOGEN URINE: NORMAL
WHITE BLOOD CELLS URINE: 0 /HPF

## 2023-04-17 ENCOUNTER — APPOINTMENT (OUTPATIENT)
Dept: UROLOGY | Facility: CLINIC | Age: 53
End: 2023-04-17

## 2023-06-01 LAB
LH SERPL-ACNC: 4.6 IU/L
TESTOST FREE SERPL-MCNC: 6.7 PG/ML
TESTOST SERPL-MCNC: 337 NG/DL

## 2023-06-02 ENCOUNTER — RX RENEWAL (OUTPATIENT)
Age: 53
End: 2023-06-02

## 2023-07-03 ENCOUNTER — RX RENEWAL (OUTPATIENT)
Age: 53
End: 2023-07-03

## 2023-08-04 ENCOUNTER — RX RENEWAL (OUTPATIENT)
Age: 53
End: 2023-08-04

## 2023-09-05 ENCOUNTER — RX RENEWAL (OUTPATIENT)
Age: 53
End: 2023-09-05

## 2023-10-18 ENCOUNTER — RX RENEWAL (OUTPATIENT)
Age: 53
End: 2023-10-18

## 2024-01-12 NOTE — ASU DISCHARGE PLAN (ADULT/PEDIATRIC) - CALL YOUR DOCTOR IF YOU HAVE ANY OF THE FOLLOWING:
------------------------------------------------------    Mental Health/Counseling/Therapy Suggestions Nearby for [facility]     [facility]   [address] [place] [address] [phone]    [facility]  [address], [address]  [place] [address] [phone]    [facility]   [address] [place] [address] [phone]    [facility] ([facility])  Multiple Locations  [phone]     -----------------------------------------------------------      Well Visit, Teens: Care Instructions  Being a teen can be exciting and tough. Some teens feel the effects of stress, such as headaches or an upset stomach. Reaching out to others for support and taking care of your health can help.    Doing fun things can lower stress. Try listening to music, drawing, or writing in a journal. You could also hang out with friends.   If you're feeling a lot of stress, anxiety, or sadness, try talking to a counselor. They can help you find ways to feel better.     Exercise most days.  You could do things like dance, ride a bike, or play a sport.     Limit your screen time.  This includes smartphones, video games, and computers.     Be careful online.  Avoid sharing personal information, like your phone number, address, or photo.     Eat healthy foods, and drink water when you're thirsty.  Add fruits and vegetables to meals and snacks. Limit soda pop and energy drinks.     Get enough sleep.  Try to get at least 8 hours of sleep every night.     Go to a trusted adult with questions about sex.  Not having sex is the safest way to prevent pregnancy and STIs (sexually transmitted infections). If you have sex, use condoms and birth control.     Say \"No thanks\" to vapes, tobacco, alcohol, and drugs.  If you need help quitting, talk to your doctor.     Think about safety if you're around  Bleeding that does not stop/Swelling that gets worse/Pain not relieved by Medications/Fever greater than (need to indicate Fahrenheit or Celsius)/Wound/Surgical Site with redness, or foul smelling discharge or pus/Numbness, tingling, color or temperature change to extremity

## 2024-02-08 ENCOUNTER — APPOINTMENT (OUTPATIENT)
Dept: UROLOGY | Facility: CLINIC | Age: 54
End: 2024-02-08
Payer: COMMERCIAL

## 2024-02-08 VITALS
RESPIRATION RATE: 15 BRPM | HEART RATE: 63 BPM | HEIGHT: 76 IN | SYSTOLIC BLOOD PRESSURE: 126 MMHG | DIASTOLIC BLOOD PRESSURE: 84 MMHG | OXYGEN SATURATION: 98 % | BODY MASS INDEX: 30.44 KG/M2 | TEMPERATURE: 97.2 F | WEIGHT: 250 LBS

## 2024-02-08 DIAGNOSIS — R39.9 UNSPECIFIED SYMPTOMS AND SIGNS INVOLVING THE GENITOURINARY SYSTEM: ICD-10-CM

## 2024-02-08 DIAGNOSIS — N52.9 MALE ERECTILE DYSFUNCTION, UNSPECIFIED: ICD-10-CM

## 2024-02-08 PROCEDURE — 99214 OFFICE O/P EST MOD 30 MIN: CPT

## 2024-02-08 RX ORDER — IBUPROFEN 800 MG
800 TABLET ORAL
Refills: 0 | Status: DISCONTINUED | COMMUNITY
End: 2024-02-08

## 2024-02-08 RX ORDER — IBUPROFEN 600 MG/1
600 TABLET, FILM COATED ORAL 3 TIMES DAILY
Qty: 30 | Refills: 0 | Status: DISCONTINUED | COMMUNITY
Start: 2021-09-02 | End: 2024-02-08

## 2024-02-08 RX ORDER — OMEPRAZOLE 20 MG/1
20 CAPSULE, DELAYED RELEASE ORAL
Refills: 0 | Status: ACTIVE | COMMUNITY

## 2024-02-08 NOTE — HISTORY OF PRESENT ILLNESS
[FreeTextEntry1] :  Pt comes in bc of decreased urinary flow, freq, nocturia. Pt having ED. PPt states cialis daily has helped.  3/20/23 PSA  0.91  5/30/23 Testosterone 337

## 2024-02-09 LAB
APPEARANCE: CLEAR
BACTERIA: NEGATIVE /HPF
BILIRUBIN URINE: NEGATIVE
BLOOD URINE: NEGATIVE
CAST: 0 /LPF
COLOR: YELLOW
EPITHELIAL CELLS: 0 /HPF
GLUCOSE QUALITATIVE U: NEGATIVE MG/DL
KETONES URINE: NEGATIVE MG/DL
LEUKOCYTE ESTERASE URINE: NEGATIVE
MICROSCOPIC-UA: NORMAL
NITRITE URINE: NEGATIVE
PH URINE: 8
PROTEIN URINE: NEGATIVE MG/DL
RED BLOOD CELLS URINE: 1 /HPF
SPECIFIC GRAVITY URINE: 1.02
UROBILINOGEN URINE: 0.2 MG/DL
WHITE BLOOD CELLS URINE: 0 /HPF

## 2024-02-12 LAB — BACTERIA UR CULT: NORMAL

## 2024-03-07 ENCOUNTER — APPOINTMENT (OUTPATIENT)
Dept: UROLOGY | Facility: CLINIC | Age: 54
End: 2024-03-07

## 2024-03-29 RX ORDER — TADALAFIL 5 MG/1
5 TABLET ORAL
Qty: 30 | Refills: 0 | Status: ACTIVE | COMMUNITY
Start: 2023-04-07 | End: 1900-01-01

## 2025-03-04 ENCOUNTER — RX RENEWAL (OUTPATIENT)
Age: 55
End: 2025-03-04